# Patient Record
Sex: MALE | Race: WHITE | NOT HISPANIC OR LATINO | Employment: FULL TIME | URBAN - METROPOLITAN AREA
[De-identification: names, ages, dates, MRNs, and addresses within clinical notes are randomized per-mention and may not be internally consistent; named-entity substitution may affect disease eponyms.]

---

## 2018-03-19 ENCOUNTER — OFFICE VISIT (OUTPATIENT)
Dept: URGENT CARE | Facility: CLINIC | Age: 38
End: 2018-03-19
Payer: COMMERCIAL

## 2018-03-19 VITALS
RESPIRATION RATE: 17 BRPM | HEIGHT: 76 IN | WEIGHT: 199 LBS | TEMPERATURE: 98 F | DIASTOLIC BLOOD PRESSURE: 87 MMHG | SYSTOLIC BLOOD PRESSURE: 117 MMHG | OXYGEN SATURATION: 96 % | BODY MASS INDEX: 24.23 KG/M2 | HEART RATE: 82 BPM

## 2018-03-19 DIAGNOSIS — J06.9 VIRAL URI WITH COUGH: Primary | ICD-10-CM

## 2018-03-19 DIAGNOSIS — J02.9 SORE THROAT: ICD-10-CM

## 2018-03-19 DIAGNOSIS — R68.89 FLU-LIKE SYMPTOMS: ICD-10-CM

## 2018-03-19 LAB
CTP QC/QA: YES
CTP QC/QA: YES
FLUAV AG NPH QL: NEGATIVE
FLUBV AG NPH QL: NEGATIVE
S PYO RRNA THROAT QL PROBE: NEGATIVE

## 2018-03-19 PROCEDURE — 87804 INFLUENZA ASSAY W/OPTIC: CPT | Mod: 59,QW,S$GLB, | Performed by: NURSE PRACTITIONER

## 2018-03-19 PROCEDURE — 87880 STREP A ASSAY W/OPTIC: CPT | Mod: QW,S$GLB,, | Performed by: NURSE PRACTITIONER

## 2018-03-19 PROCEDURE — 99203 OFFICE O/P NEW LOW 30 MIN: CPT | Mod: S$GLB,,, | Performed by: NURSE PRACTITIONER

## 2018-03-19 RX ORDER — BENZONATATE 100 MG/1
100 CAPSULE ORAL EVERY 6 HOURS PRN
Qty: 30 CAPSULE | Refills: 0 | Status: SHIPPED | OUTPATIENT
Start: 2018-03-19 | End: 2019-03-19

## 2018-03-19 RX ORDER — FLUTICASONE PROPIONATE 50 MCG
1 SPRAY, SUSPENSION (ML) NASAL 2 TIMES DAILY
Qty: 1 BOTTLE | Refills: 0 | Status: SHIPPED | OUTPATIENT
Start: 2018-03-19

## 2018-03-19 NOTE — PATIENT INSTRUCTIONS
Please drink plenty of fluids.  Please get plenty of rest.  Please return here or go to the Emergency Department for any concerns or worsening of condition.  If you do not have Hypertension or any history of palpitations, it is ok to take over the counter Sudafed or Mucinex D or Allegra-D or Claritin-D or Zyrtec-D.  If you do take one of the above, it is ok to combine that with plain over the counter Mucinex or Allegra or Claritin or Zyrtec.  If for example you are taking Zyrtec -D, you can combine that with Mucinex, but not Mucinex-D.  If you are taking Mucinex-D, you can combine that with plain Allegra or Claritin or Zyrtec.   If you do have Hypertension or palpitations, it is safe to take Coricidin HBP for relief of sinus symptoms.  If not allergic, please take over the counter Tylenol (Acetaminophen) and/or Motrin (Ibuprofen) as directed for control of pain and/or fever.  Please follow up with your primary care doctor or specialist as needed.    If you  smoke, please stop smoking.    Viral Upper Respiratory Illness (Adult)   You have a viral upper respiratory illness (URI), which is another term for the common cold. This illness is contagious during the first few days. It is spread through the air by coughing and sneezing. It may also be spread by direct contact (touching the sick person and then touching your own eyes, nose, or mouth). Frequent handwashing will decrease risk of spread. Most viral illnesses go away within 7 to 10 days with rest and simple home remedies. Sometimes the illness may last for several weeks. Antibiotics will not kill a virus, and they are generally not prescribed for this condition.    Home care  · If symptoms are severe, rest at home for the first 2 to 3 days. When you resume activity, don't let yourself get too tired.  · Avoid being exposed to cigarette smoke (yours or others).  · You may use acetaminophen or ibuprofen to control pain and fever, unless another medicine was  prescribed. (Note: If you have chronic liver or kidney disease, have ever had a stomach ulcer or gastrointestinal bleeding, or are taking blood-thinning medicines, talk with your healthcare provider before using these medicines.) Aspirin should never be given to anyone under 18 years of age who is ill with a viral infection or fever. It may cause severe liver or brain damage.  · Your appetite may be poor, so a light diet is fine. Avoid dehydration by drinking 6 to 8 glasses of fluids per day (water, soft drinks, juices, tea, or soup). Extra fluids will help loosen secretions in the nose and lungs.  · Over-the-counter cold medicines will not shorten the length of time youre sick, but they may be helpful for the following symptoms: cough, sore throat, and nasal and sinus congestion. (Note: Do not use decongestants if you have high blood pressure.)  Follow-up care  Follow up with your healthcare provider, or as advised.  When to seek medical advice  Call your healthcare provider right away if any of these occur:  · Cough with lots of colored sputum (mucus)  · Severe headache; face, neck, or ear pain  · Difficulty swallowing due to throat pain  · Fever of 100.4°F (38°C)  Call 911, or get immediate medical care  Call emergency services right away if any of these occur:  · Chest pain, shortness of breath, wheezing, or difficulty breathing  · Coughing up blood  · Inability to swallow due to throat pain  Date Last Reviewed: 9/13/2015  © 9040-6612 [employer]. [address] [place] [address], [place] [address]. All rights reserved. This information is not intended as a substitute for professional medical care. Always follow your healthcare professional's instructions.

## 2018-03-19 NOTE — PROGRESS NOTES
"Subjective:       Patient ID: Ramakrishna Vargas is a 37 y.o. male.    Vitals:  height is 6' 3.98" (1.93 m) and weight is 90.3 kg (199 lb). His oral temperature is 98.1 °F (36.7 °C). His blood pressure is 117/87 and his pulse is 82. His respiration is 17 and oxygen saturation is 96%.     Chief Complaint: Nasal Congestion (started 3/15) and Cough (started 3/15)    Patient has cough, minor pain in throat, minor weakness overall and onset since 3/15. Cough worse at night. Tried OTC cough and cold with min relief.  No fever, N/V/D or sick contacts. Denies previous history of allergy.      Review of Systems   Constitution: Positive for malaise/fatigue. Negative for chills and fever.   HENT: Negative for congestion, ear pain, hoarse voice and sore throat.    Eyes: Negative for discharge and redness.   Cardiovascular: Negative for chest pain, dyspnea on exertion and leg swelling.   Respiratory: Positive for cough, shortness of breath and sputum production. Negative for wheezing.         Throat pain   Musculoskeletal: Positive for myalgias.   Gastrointestinal: Negative for abdominal pain and nausea.   Neurological: Positive for headaches.       Objective:      Physical Exam   Constitutional: He is oriented to person, place, and time. He appears well-developed and well-nourished. He is cooperative.  Non-toxic appearance. He does not appear ill. No distress.   HENT:   Head: Normocephalic and atraumatic.   Right Ear: Hearing, tympanic membrane, external ear and ear canal normal.   Left Ear: Hearing, tympanic membrane, external ear and ear canal normal.   Nose: Rhinorrhea present. No mucosal edema or nasal deformity. No epistaxis. Right sinus exhibits no maxillary sinus tenderness and no frontal sinus tenderness. Left sinus exhibits no maxillary sinus tenderness and no frontal sinus tenderness.   Mouth/Throat: Uvula is midline and mucous membranes are normal. No trismus in the jaw. Normal dentition. No uvula swelling. Posterior " oropharyngeal erythema present.       Eyes: Conjunctivae and lids are normal. No scleral icterus.   Sclera clear bilat   Neck: Trachea normal, full passive range of motion without pain and phonation normal. Neck supple.   Cardiovascular: Normal rate, regular rhythm, normal heart sounds, intact distal pulses and normal pulses.    Pulmonary/Chest: Effort normal and breath sounds normal. No respiratory distress. He has no wheezes. He has no rhonchi. He has no rales.   Abdominal: Soft. Normal appearance and bowel sounds are normal. He exhibits no distension. There is no tenderness.   Musculoskeletal: Normal range of motion. He exhibits no edema or deformity.   Neurological: He is alert and oriented to person, place, and time. He exhibits normal muscle tone. Coordination normal.   Skin: Skin is warm, dry and intact. He is not diaphoretic. No pallor.   Psychiatric: He has a normal mood and affect. His speech is normal and behavior is normal. Judgment and thought content normal. Cognition and memory are normal.   Nursing note and vitals reviewed.      POCT Influenza A/B   Order: [id number]   Status:  Final result   Visible to patient:  No (Not Released)   Next appt:  None   Dx:  Flu-like symptoms    Ref Range & Units 09:15   Rapid Influenza A Ag Negative Negative    Rapid Influenza B Ag Negative Negative     Acceptable  Yes    Resulting Agency  [facility]           POCT rapid strep A   Order: [id number]   Status:  Final result   Visible to patient:  No (Not Released)   Next appt:  None   Dx:  Sore throat   Newer results are available. Click to view them now.    Ref Range & Units 09:08   Rapid Strep A Screen Negative Negative     Acceptable  Yes    Resulting Agency  [facility]             Assessment:       1. Viral URI with cough    2. Sore throat    3. Flu-like symptoms        Plan:         Viral URI with cough  -     fluticasone (FLONASE) 50 mcg/actuation nasal spray; 1 spray (50 mcg total) by Each Nare  route 2 (two) times daily.  Dispense: 1 Bottle; Refill: 0  -     benzonatate (TESSALON PERLES) 100 MG capsule; Take 1 capsule (100 mg total) by mouth every 6 (six) hours as needed.  Dispense: 30 capsule; Refill: 0    Sore throat  -     POCT rapid strep A    Flu-like symptoms  -     POCT Influenza A/B      Patient Instructions     Please drink plenty of fluids.  Please get plenty of rest.  Please return here or go to the Emergency Department for any concerns or worsening of condition.  If you do not have Hypertension or any history of palpitations, it is ok to take over the counter Sudafed or Mucinex D or Allegra-D or Claritin-D or Zyrtec-D.  If you do take one of the above, it is ok to combine that with plain over the counter Mucinex or Allegra or Claritin or Zyrtec.  If for example you are taking Zyrtec -D, you can combine that with Mucinex, but not Mucinex-D.  If you are taking Mucinex-D, you can combine that with plain Allegra or Claritin or Zyrtec.   If you do have Hypertension or palpitations, it is safe to take Coricidin HBP for relief of sinus symptoms.  If not allergic, please take over the counter Tylenol (Acetaminophen) and/or Motrin (Ibuprofen) as directed for control of pain and/or fever.  Please follow up with your primary care doctor or specialist as needed.    If you  smoke, please stop smoking.    Viral Upper Respiratory Illness (Adult)   You have a viral upper respiratory illness (URI), which is another term for the common cold. This illness is contagious during the first few days. It is spread through the air by coughing and sneezing. It may also be spread by direct contact (touching the sick person and then touching your own eyes, nose, or mouth). Frequent handwashing will decrease risk of spread. Most viral illnesses go away within 7 to 10 days with rest and simple home remedies. Sometimes the illness may last for several weeks. Antibiotics will not kill a virus, and they are generally not  prescribed for this condition.    Home care  · If symptoms are severe, rest at home for the first 2 to 3 days. When you resume activity, don't let yourself get too tired.  · Avoid being exposed to cigarette smoke (yours or others).  · You may use acetaminophen or ibuprofen to control pain and fever, unless another medicine was prescribed. (Note: If you have chronic liver or kidney disease, have ever had a stomach ulcer or gastrointestinal bleeding, or are taking blood-thinning medicines, talk with your healthcare provider before using these medicines.) Aspirin should never be given to anyone under 18 years of age who is ill with a viral infection or fever. It may cause severe liver or brain damage.  · Your appetite may be poor, so a light diet is fine. Avoid dehydration by drinking 6 to 8 glasses of fluids per day (water, soft drinks, juices, tea, or soup). Extra fluids will help loosen secretions in the nose and lungs.  · Over-the-counter cold medicines will not shorten the length of time youre sick, but they may be helpful for the following symptoms: cough, sore throat, and nasal and sinus congestion. (Note: Do not use decongestants if you have high blood pressure.)  Follow-up care  Follow up with your healthcare provider, or as advised.  When to seek medical advice  Call your healthcare provider right away if any of these occur:  · Cough with lots of colored sputum (mucus)  · Severe headache; face, neck, or ear pain  · Difficulty swallowing due to throat pain  · Fever of 100.4°F (38°C)  Call 911, or get immediate medical care  Call emergency services right away if any of these occur:  · Chest pain, shortness of breath, wheezing, or difficulty breathing  · Coughing up blood  · Inability to swallow due to throat pain  Date Last Reviewed: 9/13/2015  © 4994-5540 [employer]. [address] [place] [address], [place] [address]. All rights reserved. This information is not intended as a substitute for  professional medical care. Always follow your healthcare professional's instructions.

## 2019-04-02 ENCOUNTER — HOSPITAL ENCOUNTER (OUTPATIENT)
Dept: RADIOLOGY | Facility: HOSPITAL | Age: 39
Discharge: HOME | End: 2019-04-02
Attending: ORTHOPAEDIC SURGERY
Payer: COMMERCIAL

## 2019-04-02 ENCOUNTER — OFFICE VISIT (OUTPATIENT)
Dept: SPORTS MEDICINE | Facility: CLINIC | Age: 39
End: 2019-04-02
Payer: COMMERCIAL

## 2019-04-02 VITALS
DIASTOLIC BLOOD PRESSURE: 87 MMHG | HEIGHT: 75 IN | BODY MASS INDEX: 24.74 KG/M2 | SYSTOLIC BLOOD PRESSURE: 151 MMHG | WEIGHT: 199 LBS | HEART RATE: 62 BPM

## 2019-04-02 DIAGNOSIS — M25.561 RIGHT KNEE PAIN, UNSPECIFIED CHRONICITY: Primary | ICD-10-CM

## 2019-04-02 DIAGNOSIS — M25.561 RIGHT KNEE PAIN, UNSPECIFIED CHRONICITY: ICD-10-CM

## 2019-04-02 PROCEDURE — 73564 X-RAY EXAM KNEE 4 OR MORE: CPT | Mod: TC,50,FY,PO

## 2019-04-02 PROCEDURE — 73564 X-RAY EXAM KNEE 4 OR MORE: CPT | Mod: 26,50,, | Performed by: RADIOLOGY

## 2019-04-02 PROCEDURE — 99203 OFFICE O/P NEW LOW 30 MIN: CPT | Mod: S$GLB,,, | Performed by: ORTHOPAEDIC SURGERY

## 2019-04-02 PROCEDURE — 99999 PR PBB SHADOW E&M-EST. PATIENT-LVL III: CPT | Mod: PBBFAC,,, | Performed by: ORTHOPAEDIC SURGERY

## 2019-04-02 PROCEDURE — 99999 PR PBB SHADOW E&M-EST. PATIENT-LVL III: ICD-10-PCS | Mod: PBBFAC,,, | Performed by: ORTHOPAEDIC SURGERY

## 2019-04-02 PROCEDURE — 73564 XR KNEE ORTHO BILAT WITH FLEXION: ICD-10-PCS | Mod: 26,50,, | Performed by: RADIOLOGY

## 2019-04-02 PROCEDURE — 99203 PR OFFICE/OUTPT VISIT, NEW, LEVL III, 30-44 MIN: ICD-10-PCS | Mod: S$GLB,,, | Performed by: ORTHOPAEDIC SURGERY

## 2019-04-02 NOTE — PROGRESS NOTES
CC: Right knee pain    39 y.o. Male with a history of right knee pain for a couple months.  He was working on his fence at home; squatting and turning. Feels like he is changing his gait due to pain knee pain and its causing him to have low back pain.  Previously tried Naproxen.    He reports that the pain and weakness. It also bothers him at night.    + mechanical symptoms, - instability    Is affecting ADLs.  Pain is 0/10 at it's worst.    REVIEW OF SYSTEMS:   Constitution: Negative. Negative for chills, fever and night sweats.   HENT: Negative for congestion and headaches.    Eyes: Negative for blurred vision, left vision loss and right vision loss.   Cardiovascular: Negative for chest pain and syncope.   Respiratory: Negative for cough and shortness of breath.    Endocrine: Negative for polydipsia, polyphagia and polyuria.   Hematologic/Lymphatic: Negative for bleeding problem. Does not bruise/bleed easily.   Skin: Negative for dry skin, itching and rash.   Musculoskeletal: Negative for falls. Positive for right knee pain and  muscle weakness.   Gastrointestinal: Negative for abdominal pain and bowel incontinence.   Genitourinary: Negative for bladder incontinence and nocturia.   Neurological: Negative for disturbances in coordination, loss of balance and seizures.   Psychiatric/Behavioral: Negative for depression. The patient does not have insomnia.    Allergic/Immunologic: Negative for hives and persistent infections.     PAST MEDICAL HISTORY:   History reviewed. No pertinent past medical history.    PAST SURGICAL HISTORY:   Past Surgical History:   Procedure Laterality Date    REMOVAL, FOREIGN BODY, FOOT Left 12/29/2012    Performed by Hillary Aguila MD at [facility] OR [address] [place] [address]       FAMILY HISTORY:   No family history on file.    SOCIAL HISTORY:   Social History     Socioeconomic History    Marital status: Single     Spouse name: Not on file    Number of children: Not on file    Years of education: Not  "on file    Highest education level: Not on file   Occupational History    Not on file   Social Needs    Financial resource strain: Not on file    Food insecurity:     Worry: Not on file     Inability: Not on file    Transportation needs:     Medical: Not on file     Non-medical: Not on file   Tobacco Use    Smoking status: Never Smoker   Substance and Sexual Activity    Alcohol use: Yes     Comment: + moderate use of alcohol.  Lives at home    Drug use: Not on file    Sexual activity: Not on file   Lifestyle    Physical activity:     Days per week: Not on file     Minutes per session: Not on file    Stress: Not on file   Relationships    Social connections:     Talks on phone: Not on file     Gets together: Not on file     Attends [employer] service: Not on file     Active member of club or organization: Not on file     Attends meetings of clubs or organizations: Not on file     Relationship status: Not on file    Intimate partner violence:     Fear of current or ex partner: Not on file     Emotionally abused: Not on file     Physically abused: Not on file     Forced sexual activity: Not on file   Other Topics Concern    Not on file   Social History Narrative    Not on file       MEDICATIONS:     Current Outpatient Medications:     fluticasone (FLONASE) 50 mcg/actuation nasal spray, 1 spray (50 mcg total) by Each Nare route 2 (two) times daily., Disp: 1 Bottle, Rfl: 0    ALLERGIES:   Review of patient's allergies indicates:   Allergen Reactions    Acetaminophen      Patient states he thought he would be nauseous when he took.        VITAL SIGNS:   BP (!) 151/87   Pulse 62   Ht 6' 3" (1.905 m)   Wt 90.3 kg (199 lb)   BMI 24.87 kg/m²      PHYSICAL EXAMINATION:  General:  The patient is alert and oriented x 3.  Mood is pleasant.  Observation of ears, eyes and nose reveal no gross abnormalities.  No labored breathing observed.    RIGHT KNEE EXAMINATION     OBSERVATION / INSPECTION   Gait:   Antalgic "   Alignment:  Neutral   Scars:   None   Muscle atrophy: Mild  Effusion:  None   Warmth:  None   Discoloration:   none     TENDERNESS / CREPITUS (T / C):          T / C      T / C   Patella   - / -   Lateral joint line   - / -   Peripatellar medial  -  Medial joint line    + / -   Peripatellar lateral -  Medial plica   - / -   Patellar tendon -   Popliteal fossa   - / -   Quad tendon   -   Gastrocnemius   -   Prepatellar Bursa - / -   Quadricep   -   Tibial tubercle  -  Thigh/hamstring  -   Pes anserine/HS -  Fibula    -   ITB   - / -  Tibia     -   Tib/fib joint  - / -  LCL    -     MFC   - / -   MCL: Proximal  -    LFC   - / -    Distal   -          ROM: (* = pain)  PASSIVE   ACTIVE    Left :   5 / 0 / 145   5 / 0 / 145     Right :    5 / 0 / 145   5 / 0 / 145    Patellofemoral examination:  See above noted areas of tenderness.   Patella position    Subluxation / dislocation: Centered           Sup. / Inf;   Normal   Crepitus (PF):    Absent   Patellar Mobility:       Medial-lateral:   Normal    Superior-inferior:  Normal    Inferior tilt   Normal    Patellar tendon:  Normal   Lateral tilt:    Normal   J-sign:     None   Patellofemoral grind:   No pain       MENISCAL SIGNS:     Pain on terminal extension:  -  Pain on terminal flexion:  +  Rebekahs maneuver:  + (for pain)  Squat     + (for pain)    LIGAMENT EXAMINATION:  ACL / Lachman:  normal (-1 to 2mm)    PCL-Post.  drawer: normal 0 to 2mm  MCL- Valgus:  normal 0 to 2mm  LCL- Varus:  normal 0 to 2mm  Pivot shift: normal (Equal)   Dial Test: difference c/w other side   At 30° flexion: normal (< 5°)    At 90° flexion: normal (< 5°)   Reverse Pivot Shift:   normal (Equal)     STRENGTH: (* = with pain) PAINFUL SIDE   Quadricep   5/5   Hamstrin/5    EXTREMITY NEURO-VASCULAR EXAMINATION:   Sensation:  Grossly intact to light touch all dermatomal regions.   Motor Function:  Fully intact motor function at hip, knee, foot and ankle    DTRs;  quadriceps and   achilles 2+.  No clonus and downgoing Babinski.    Vascular status:  DP and PT pulses 2+, brisk capillary refill, symmetric.     XRAY (4/2/19): There is mild medial tibiofemoral joint space narrowing of the bilateral knees.  No significant osteophyte formation.  No evidence for radiopaque foreign body or chondrocalcinosis.  No fracture or dislocation.  Bony mineralization appears within normal limits.  No large joint effusion appreciated.  Surrounding soft tissues are unremarkable.      ASSESSMENT:    Right knee pain, medial meniscus tear    PLAN:   1. MRI Right knee  2. Follow up in clinic to discuss results      All questions were answered, pt will contact us for questions or concerns in the interim.

## 2019-04-09 ENCOUNTER — HOSPITAL ENCOUNTER (OUTPATIENT)
Dept: RADIOLOGY | Facility: HOSPITAL | Age: 39
Discharge: HOME | End: 2019-04-09
Attending: STUDENT IN AN ORGANIZED HEALTH CARE EDUCATION/TRAINING PROGRAM
Payer: COMMERCIAL

## 2019-04-09 DIAGNOSIS — M25.561 RIGHT KNEE PAIN, UNSPECIFIED CHRONICITY: ICD-10-CM

## 2019-04-09 PROCEDURE — 73721 MRI JNT OF LWR EXTRE W/O DYE: CPT | Mod: 26,RT,, | Performed by: RADIOLOGY

## 2019-04-09 PROCEDURE — 73721 MRI JNT OF LWR EXTRE W/O DYE: CPT | Mod: TC,RT

## 2019-04-09 PROCEDURE — 73721 MRI KNEE WITHOUT CONTRAST RIGHT: ICD-10-PCS | Mod: 26,RT,, | Performed by: RADIOLOGY

## 2019-04-12 ENCOUNTER — OFFICE VISIT (OUTPATIENT)
Dept: SPORTS MEDICINE | Facility: CLINIC | Age: 39
End: 2019-04-12
Payer: COMMERCIAL

## 2019-04-12 VITALS
HEIGHT: 75 IN | SYSTOLIC BLOOD PRESSURE: 138 MMHG | WEIGHT: 199 LBS | BODY MASS INDEX: 24.74 KG/M2 | DIASTOLIC BLOOD PRESSURE: 86 MMHG | HEART RATE: 68 BPM

## 2019-04-12 DIAGNOSIS — S83.241D TEAR OF MEDIAL MENISCUS OF RIGHT KNEE, CURRENT, UNSPECIFIED TEAR TYPE, SUBSEQUENT ENCOUNTER: Primary | ICD-10-CM

## 2019-04-12 PROCEDURE — 99999 PR PBB SHADOW E&M-EST. PATIENT-LVL III: CPT | Mod: PBBFAC,,, | Performed by: PHYSICIAN ASSISTANT

## 2019-04-12 PROCEDURE — 99214 PR OFFICE/OUTPT VISIT, EST, LEVL IV, 30-39 MIN: ICD-10-PCS | Mod: S$GLB,,, | Performed by: PHYSICIAN ASSISTANT

## 2019-04-12 PROCEDURE — 99999 PR PBB SHADOW E&M-EST. PATIENT-LVL III: ICD-10-PCS | Mod: PBBFAC,,, | Performed by: PHYSICIAN ASSISTANT

## 2019-04-12 PROCEDURE — 99214 OFFICE O/P EST MOD 30 MIN: CPT | Mod: S$GLB,,, | Performed by: PHYSICIAN ASSISTANT

## 2019-04-12 RX ORDER — MELOXICAM 15 MG/1
15 TABLET ORAL DAILY
Qty: 30 TABLET | Refills: 0 | Status: SHIPPED | OUTPATIENT
Start: 2019-04-12 | End: 2019-05-12

## 2019-04-12 NOTE — PROGRESS NOTES
CC: Right knee pain    39 y.o. Male with a history of right knee pain for a couple months. He was working on his fence at home; squatting and turning and pain developed afterwards. No acute injury or trauma. He previously was limping due to knee pain but this has much improved since last visit. He has been taking Naproxen with good relief. No prior knee surgery.    He reports that the pain and weakness. It also bothers him at night.    + mechanical symptoms, - instability    Is affecting ADLs.  Pain is 0/10  Today.      REVIEW OF SYSTEMS:   Constitution: Negative. Negative for chills, fever and night sweats.   HENT: Negative for congestion and headaches.    Eyes: Negative for blurred vision, left vision loss and right vision loss.   Cardiovascular: Negative for chest pain and syncope.   Respiratory: Negative for cough and shortness of breath.    Endocrine: Negative for polydipsia, polyphagia and polyuria.   Hematologic/Lymphatic: Negative for bleeding problem. Does not bruise/bleed easily.   Skin: Negative for dry skin, itching and rash.   Musculoskeletal: Negative for falls. Positive for right knee pain and  muscle weakness.   Gastrointestinal: Negative for abdominal pain and bowel incontinence.   Genitourinary: Negative for bladder incontinence and nocturia.   Neurological: Negative for disturbances in coordination, loss of balance and seizures.   Psychiatric/Behavioral: Negative for depression. The patient does not have insomnia.    Allergic/Immunologic: Negative for hives and persistent infections.     PAST MEDICAL HISTORY:   History reviewed. No pertinent past medical history.    PAST SURGICAL HISTORY:   Past Surgical History:   Procedure Laterality Date    REMOVAL, FOREIGN BODY, FOOT Left 12/29/2012    Performed by Hillary Aguila MD at [facility] OR [address] [place] [address]       FAMILY HISTORY:   History reviewed. No pertinent family history.    SOCIAL HISTORY:   Social History     Socioeconomic History    Marital status:  "Single     Spouse name: Not on file    Number of children: Not on file    Years of education: Not on file    Highest education level: Not on file   Occupational History    Not on file   Social Needs    Financial resource strain: Not on file    Food insecurity:     Worry: Not on file     Inability: Not on file    Transportation needs:     Medical: Not on file     Non-medical: Not on file   Tobacco Use    Smoking status: Never Smoker   Substance and Sexual Activity    Alcohol use: Yes     Comment: + moderate use of alcohol.  Lives at home    Drug use: Not on file    Sexual activity: Not on file   Lifestyle    Physical activity:     Days per week: Not on file     Minutes per session: Not on file    Stress: Not on file   Relationships    Social connections:     Talks on phone: Not on file     Gets together: Not on file     Attends [employer] service: Not on file     Active member of club or organization: Not on file     Attends meetings of clubs or organizations: Not on file     Relationship status: Not on file   Other Topics Concern    Not on file   Social History Narrative    Not on file       MEDICATIONS:     Current Outpatient Medications:     fluticasone (FLONASE) 50 mcg/actuation nasal spray, 1 spray (50 mcg total) by Each Nare route 2 (two) times daily., Disp: 1 Bottle, Rfl: 0    ALLERGIES:   Review of patient's allergies indicates:   Allergen Reactions    Acetaminophen      Patient states he thought he would be nauseous when he took.        VITAL SIGNS:   /86   Pulse 68   Ht 6' 3" (1.905 m)   Wt 90.3 kg (199 lb)   BMI 24.87 kg/m²      PHYSICAL EXAMINATION:  General:  The patient is alert and oriented x 3.  Mood is pleasant.  Observation of ears, eyes and nose reveal no gross abnormalities.  No labored breathing observed.    RIGHT KNEE EXAMINATION     OBSERVATION / INSPECTION   Gait:   Antalgic   Alignment:  Neutral   Scars:   None   Muscle atrophy: Mild  Effusion:  None   Warmth:  None "   Discoloration:   none     TENDERNESS / CREPITUS (T / C):          T / C      T / C   Patella   - / -   Lateral joint line   - / -   Peripatellar medial  -  Medial joint line    + / -   Peripatellar lateral -  Medial plica   - / -   Patellar tendon -   Popliteal fossa   - / -   Quad tendon   -   Gastrocnemius   -   Prepatellar Bursa - / -   Quadricep   -   Tibial tubercle  -  Thigh/hamstring  -   Pes anserine/HS -  Fibula    -   ITB   - / -  Tibia     -   Tib/fib joint  - / -  LCL    -     MFC   - / -   MCL: Proximal  -    LFC   - / -    Distal   -          ROM: (* = pain)  PASSIVE   ACTIVE    Left :   5 / 0 / 145   5 / 0 / 145     Right :    5 / 0 / 145   5 / 0 / 145    Patellofemoral examination:  See above noted areas of tenderness.   Patella position    Subluxation / dislocation: Centered           Sup. / Inf;   Normal   Crepitus (PF):    Absent   Patellar Mobility:       Medial-lateral:   Normal    Superior-inferior:  Normal    Inferior tilt   Normal    Patellar tendon:  Normal   Lateral tilt:    Normal   J-sign:     None   Patellofemoral grind:   No pain       MENISCAL SIGNS:     Pain on terminal extension:  -  Pain on terminal flexion:  +  Rebekahs maneuver:  + (for pain)  Squat     + (for pain)    LIGAMENT EXAMINATION:  ACL / Lachman:  normal (-1 to 2mm)    PCL-Post.  drawer: normal 0 to 2mm  MCL- Valgus:  normal 0 to 2mm  LCL- Varus:  normal 0 to 2mm  Pivot shift: normal (Equal)   Dial Test: difference c/w other side   At 30° flexion: normal (< 5°)    At 90° flexion: normal (< 5°)   Reverse Pivot Shift:   normal (Equal)     STRENGTH: (* = with pain) PAINFUL SIDE   Quadricep   5/5   Hamstrin/5    EXTREMITY NEURO-VASCULAR EXAMINATION:   Sensation:  Grossly intact to light touch all dermatomal regions.   Motor Function:  Fully intact motor function at hip, knee, foot and ankle    DTRs;  quadriceps and  achilles 2+.  No clonus and downgoing Babinski.    Vascular status:  DP and PT pulses 2+, brisk  capillary refill, symmetric.     XRAY (4/2/19): There is mild medial tibiofemoral joint space narrowing of the bilateral knees.  No significant osteophyte formation.  No evidence for radiopaque foreign body or chondrocalcinosis.  No fracture or dislocation.  Bony mineralization appears within normal limits.  No large joint effusion appreciated.  Surrounding soft tissues are unremarkable.     RIGHT KNEE MRI 4/9/19    FINDINGS:  Menisci:  There is a horizontal tear of the posterior horn and body segment medial meniscus.  Lateral meniscus is intact.    Ligaments:  ACL, PCL, MCL, and LCL complex are intact.    Tendons:  Extensor mechanism is maintained.    Cartilage:    Patellofemoral: Partial-thickness fissuring noted over the patellar median ridge.    Medial tibiofemoral: Articular cartilage is maintained.    Lateral tibiofemoral: Articular cartilage is maintained.    Bone: There is a mild subcortical fracture with bone marrow edema at the medial aspect of the medial femoral condyle.    Miscellaneous: There is no joint effusion.    Impression:  1. Horizontal tear of posterior horn and body segment medial meniscus.  2. Mild subcortical fracture of medial femoral condyle.  3. Partial-thickness chondral fissuring over the patellar median ridge.    ASSESSMENT:    Right knee pain, medial meniscus tear    PLAN:   1. Start mobic  2. Knee arthroscopy, PT and CSI discussed. Patient is feeling pretty good today but will let us know if pain returns and he would like to schedule a knee arthroscopy.  3. RTC as needed for follow up    All questions were answered, pt will contact us for questions or concerns in the interim.

## 2020-09-08 ENCOUNTER — OFFICE VISIT (OUTPATIENT)
Dept: DERMATOLOGY | Facility: CLINIC | Age: 40
End: 2020-09-08
Payer: COMMERCIAL

## 2020-09-08 DIAGNOSIS — B35.1 ONYCHOMYCOSIS DUE TO DERMATOPHYTE: ICD-10-CM

## 2020-09-08 DIAGNOSIS — Z80.8 FAMILY HISTORY OF MELANOMA: ICD-10-CM

## 2020-09-08 DIAGNOSIS — D48.5 NEOPLASM OF UNCERTAIN BEHAVIOR OF SKIN: Primary | ICD-10-CM

## 2020-09-08 DIAGNOSIS — L82.1 SK (SEBORRHEIC KERATOSIS): ICD-10-CM

## 2020-09-08 DIAGNOSIS — D22.9 MULTIPLE BENIGN NEVI: ICD-10-CM

## 2020-09-08 DIAGNOSIS — L81.4 SOLAR LENTIGO: ICD-10-CM

## 2020-09-08 PROCEDURE — 99203 PR OFFICE/OUTPT VISIT, NEW, LEVL III, 30-44 MIN: ICD-10-PCS | Mod: 25,S$GLB,, | Performed by: PATHOLOGY

## 2020-09-08 PROCEDURE — 11103 TANGNTL BX SKIN EA SEP/ADDL: CPT | Mod: S$GLB,,, | Performed by: PATHOLOGY

## 2020-09-08 PROCEDURE — 11103 PR TANGENTIAL BIOPSY, SKIN, EA ADDTL LESION: ICD-10-PCS | Mod: S$GLB,,, | Performed by: PATHOLOGY

## 2020-09-08 PROCEDURE — 88305 TISSUE EXAM BY PATHOLOGIST: CPT | Mod: 26,,, | Performed by: DERMATOLOGY

## 2020-09-08 PROCEDURE — 11102 TANGNTL BX SKIN SINGLE LES: CPT | Mod: S$GLB,,, | Performed by: PATHOLOGY

## 2020-09-08 PROCEDURE — 99203 OFFICE O/P NEW LOW 30 MIN: CPT | Mod: 25,S$GLB,, | Performed by: PATHOLOGY

## 2020-09-08 PROCEDURE — 88305 TISSUE EXAM BY PATHOLOGIST: CPT | Performed by: DERMATOLOGY

## 2020-09-08 PROCEDURE — 88342 IMHCHEM/IMCYTCHM 1ST ANTB: CPT | Mod: 26,,, | Performed by: DERMATOLOGY

## 2020-09-08 PROCEDURE — 99999 PR PBB SHADOW E&M-EST. PATIENT-LVL III: ICD-10-PCS | Mod: PBBFAC,,, | Performed by: PATHOLOGY

## 2020-09-08 PROCEDURE — 88305 TISSUE EXAM BY PATHOLOGIST: ICD-10-PCS | Mod: 26,,, | Performed by: DERMATOLOGY

## 2020-09-08 PROCEDURE — 11102 PR TANGENTIAL BIOPSY, SKIN, SINGLE LESION: ICD-10-PCS | Mod: S$GLB,,, | Performed by: PATHOLOGY

## 2020-09-08 PROCEDURE — 88342 IMHCHEM/IMCYTCHM 1ST ANTB: CPT | Performed by: DERMATOLOGY

## 2020-09-08 PROCEDURE — 88342 CHG IMMUNOCYTOCHEMISTRY: ICD-10-PCS | Mod: 26,,, | Performed by: DERMATOLOGY

## 2020-09-08 PROCEDURE — 99999 PR PBB SHADOW E&M-EST. PATIENT-LVL III: CPT | Mod: PBBFAC,,, | Performed by: PATHOLOGY

## 2020-09-08 RX ORDER — TAVABOROLE 43.5 MG/ML
1 SOLUTION TOPICAL DAILY
Qty: 4 ML | Refills: 3 | Status: SHIPPED | OUTPATIENT
Start: 2020-09-08 | End: 2022-08-05

## 2020-09-08 NOTE — PATIENT INSTRUCTIONS

## 2020-09-08 NOTE — PROGRESS NOTES
"  Subjective:       Patient ID:  Ramakrishna Vargas is a 40 y.o. male who presents for   Chief Complaint   Patient presents with    Skin Check     UBSE     HPI     Patient is here today for a "mole" check.   Pt has a history of  moderate sun exposure in the past.   Pt recalls several blistering sunburns in the past- yes  Pt has history of tanning bed use- no  Pt has  had moles removed in the past- yes all benign   Pt has history of melanoma in first degree relatives-  Father had some type not sure    Pt presents today for UBSE    Review of Systems   Constitutional: Negative for fever, chills, weight loss, weight gain, fatigue, night sweats and malaise.   Skin: Positive for activity-related sunscreen use and wears hat. Negative for itching, rash and daily sunscreen use.   Hematologic/Lymphatic: Does not bruise/bleed easily.        Objective:    Physical Exam   Constitutional: He appears well-developed and well-nourished. No distress.   Neurological: He is alert and oriented to person, place, and time. He is not disoriented.   Psychiatric: He has a normal mood and affect.   Skin:   Areas Examined (abnormalities noted in diagram):   Scalp / Hair Palpated and Inspected  Head / Face Inspection Performed  Neck Inspection Performed  Chest / Axilla Inspection Performed  Abdomen Inspection Performed  Genitals / Buttocks / Groin Inspection Performed  Back Inspection Performed  RUE Inspected  LUE Inspection Performed  RLE Inspected  LLE Inspection Performed  Nails and Digits Inspection Performed                       Diagram Legend     Erythematous scaling macule/papule c/w actinic keratosis       Vascular papule c/w angioma      Pigmented verrucoid papule/plaque c/w seborrheic keratosis      Yellow umbilicated papule c/w sebaceous hyperplasia      Irregularly shaped tan macule c/w lentigo     1-2 mm smooth white papules consistent with Milia      Movable subcutaneous cyst with punctum c/w epidermal inclusion cyst      Subcutaneous " movable cyst c/w pilar cyst      Firm pink to brown papule c/w dermatofibroma      Pedunculated fleshy papule(s) c/w skin tag(s)      Evenly pigmented macule c/w junctional nevus     Mildly variegated pigmented, slightly irregular-bordered macule c/w mildly atypical nevus      Flesh colored to evenly pigmented papule c/w intradermal nevus       Pink pearly papule/plaque c/w basal cell carcinoma      Erythematous hyperkeratotic cursted plaque c/w SCC      Surgical scar with no sign of skin cancer recurrence      Open and closed comedones      Inflammatory papules and pustules      Verrucoid papule consistent consistent with wart     Erythematous eczematous patches and plaques     Dystrophic onycholytic nail with subungual debris c/w onychomycosis     Umbilicated papule    Erythematous-base heme-crusted tan verrucoid plaque consistent with inflamed seborrheic keratosis     Erythematous Silvery Scaling Plaque c/w Psoriasis     See annotation      Assessment / Plan:      Pathology Orders:     Normal Orders This Visit    Specimen to Pathology, Dermatology     Questions:    Procedure Type: Dermatology and skin neoplasms    Number of Specimens: 2    ------------------------: -------------------------    Spec 1 Procedure: Biopsy    Spec 1 Clinical Impression: r/o atypical nevus    Spec 1 Source: infra-umbilical    ------------------------: -------------------------    Spec 2 Procedure: Biopsy    Spec 2 Clinical Impression: r/o atypical nevus    Spec 2 Source: left abdomen        Neoplasm of uncertain behavior of skin  -     Specimen to Pathology, Dermatology    Shave biopsy procedure note:    Shave biopsies x 2 performed after verbal consent including risk of infection, scar, recurrence, need for additional treatment of site. Area prepped with alcohol, anesthetized with approximately 1.0cc of 1% lidocaine with epinephrine. Lesional tissue shaved with razor blade. Hemostasis achieved with application of aluminum chloride  followed by hyfrecation. No complications. Dressing applied. Wound care explained.        Multiple benign nevi - Patient with several mildly atypical nevi. Instructed patient to observe lesion(s) for changes and follow up in clinic if changes are noted. Discussed ABCDE's of moles and brochure provided.      Solar lentigo - This is a benign hyperpigmented sun induced lesion. Daily sun protection will reduce the number of new lesions. Treatment of these benign lesions are considered cosmetic.      SK (seborrheic keratosis) - These are benign inherited growths without a malignant potential. Reassurance given to patient. No treatment is necessary.       Family history of melanoma - Total body skin examination performed today including at least 12 points as noted in physical examination. No lesions suspicious for malignancy noted aside from 2 pigmented lesions biopsied today.    Onychomycosis - Kerydin topical soln daily           No follow-ups on file.

## 2020-09-15 LAB
FINAL PATHOLOGIC DIAGNOSIS: NORMAL
GROSS: NORMAL
MICROSCOPIC EXAM: NORMAL

## 2020-09-29 ENCOUNTER — TELEPHONE (OUTPATIENT)
Dept: DERMATOLOGY | Facility: CLINIC | Age: 40
End: 2020-09-29

## 2020-09-29 NOTE — TELEPHONE ENCOUNTER
Called pt. Left  about path results , tried calling last week as well ----- Message from Loli Jimenes sent at 9/29/2020  8:35 AM CDT -----  Regarding: pt  BV -pt- pt is returning the nurses from Wed of last week about his test results can you please call pt at [phone].    SHANON

## 2020-10-01 ENCOUNTER — PATIENT MESSAGE (OUTPATIENT)
Dept: DERMATOLOGY | Facility: CLINIC | Age: 40
End: 2020-10-01

## 2021-04-28 ENCOUNTER — PATIENT MESSAGE (OUTPATIENT)
Dept: RESEARCH | Facility: HOSPITAL | Age: 41
End: 2021-04-28

## 2021-08-19 ENCOUNTER — LAB VISIT (OUTPATIENT)
Dept: LAB | Facility: HOSPITAL | Age: 41
End: 2021-08-19
Attending: INTERNAL MEDICINE
Payer: COMMERCIAL

## 2021-08-19 ENCOUNTER — OFFICE VISIT (OUTPATIENT)
Dept: INTERNAL MEDICINE | Facility: CLINIC | Age: 41
End: 2021-08-19
Payer: COMMERCIAL

## 2021-08-19 VITALS
DIASTOLIC BLOOD PRESSURE: 70 MMHG | BODY MASS INDEX: 26.63 KG/M2 | SYSTOLIC BLOOD PRESSURE: 120 MMHG | HEART RATE: 85 BPM | WEIGHT: 218.69 LBS | OXYGEN SATURATION: 95 % | HEIGHT: 76 IN

## 2021-08-19 DIAGNOSIS — R11.0 NAUSEA: ICD-10-CM

## 2021-08-19 DIAGNOSIS — R23.2 FLUSHING: ICD-10-CM

## 2021-08-19 DIAGNOSIS — R61 SWEAT, SWEATING, EXCESSIVE: ICD-10-CM

## 2021-08-19 DIAGNOSIS — R23.2 FLUSHING: Primary | ICD-10-CM

## 2021-08-19 LAB
ALBUMIN SERPL BCP-MCNC: 3.7 G/DL (ref 3.5–5.2)
ALP SERPL-CCNC: 184 U/L (ref 55–135)
ALT SERPL W/O P-5'-P-CCNC: 149 U/L (ref 10–44)
ANION GAP SERPL CALC-SCNC: 13 MMOL/L (ref 8–16)
AST SERPL-CCNC: 66 U/L (ref 10–40)
BASOPHILS # BLD AUTO: 0.24 K/UL (ref 0–0.2)
BASOPHILS NFR BLD: 2 % (ref 0–1.9)
BILIRUB SERPL-MCNC: 0.6 MG/DL (ref 0.1–1)
BILIRUB UR QL STRIP: NEGATIVE
BUN SERPL-MCNC: 12 MG/DL (ref 6–20)
CALCIUM SERPL-MCNC: 9.5 MG/DL (ref 8.7–10.5)
CHLORIDE SERPL-SCNC: 98 MMOL/L (ref 95–110)
CLARITY UR REFRACT.AUTO: CLEAR
CO2 SERPL-SCNC: 25 MMOL/L (ref 23–29)
COLOR UR AUTO: YELLOW
CREAT SERPL-MCNC: 1.1 MG/DL (ref 0.5–1.4)
DIFFERENTIAL METHOD: ABNORMAL
EOSINOPHIL # BLD AUTO: 0.4 K/UL (ref 0–0.5)
EOSINOPHIL NFR BLD: 3.4 % (ref 0–8)
ERYTHROCYTE [DISTWIDTH] IN BLOOD BY AUTOMATED COUNT: 12.3 % (ref 11.5–14.5)
EST. GFR  (AFRICAN AMERICAN): >60 ML/MIN/1.73 M^2
EST. GFR  (NON AFRICAN AMERICAN): >60 ML/MIN/1.73 M^2
GLUCOSE SERPL-MCNC: 90 MG/DL (ref 70–110)
GLUCOSE UR QL STRIP: NEGATIVE
HCT VFR BLD AUTO: 48.7 % (ref 40–54)
HGB BLD-MCNC: 15.7 G/DL (ref 14–18)
HGB UR QL STRIP: NEGATIVE
IMM GRANULOCYTES # BLD AUTO: 0.05 K/UL (ref 0–0.04)
IMM GRANULOCYTES NFR BLD AUTO: 0.4 % (ref 0–0.5)
KETONES UR QL STRIP: NEGATIVE
LEUKOCYTE ESTERASE UR QL STRIP: NEGATIVE
LYMPHOCYTES # BLD AUTO: 5.7 K/UL (ref 1–4.8)
LYMPHOCYTES NFR BLD: 46.8 % (ref 18–48)
MCH RBC QN AUTO: 28.9 PG (ref 27–31)
MCHC RBC AUTO-ENTMCNC: 32.2 G/DL (ref 32–36)
MCV RBC AUTO: 90 FL (ref 82–98)
MICROSCOPIC COMMENT: NORMAL
MONOCYTES # BLD AUTO: 1.7 K/UL (ref 0.3–1)
MONOCYTES NFR BLD: 13.5 % (ref 4–15)
NEUTROPHILS # BLD AUTO: 4.1 K/UL (ref 1.8–7.7)
NEUTROPHILS NFR BLD: 33.9 % (ref 38–73)
NITRITE UR QL STRIP: NEGATIVE
NRBC BLD-RTO: 0 /100 WBC
PH UR STRIP: 7 [PH] (ref 5–8)
PLATELET # BLD AUTO: 378 K/UL (ref 150–450)
PMV BLD AUTO: 9.5 FL (ref 9.2–12.9)
POTASSIUM SERPL-SCNC: 4.3 MMOL/L (ref 3.5–5.1)
PROT SERPL-MCNC: 7.5 G/DL (ref 6–8.4)
PROT UR QL STRIP: NEGATIVE
RBC # BLD AUTO: 5.44 M/UL (ref 4.6–6.2)
SODIUM SERPL-SCNC: 136 MMOL/L (ref 136–145)
SP GR UR STRIP: 1.02 (ref 1–1.03)
TSH SERPL DL<=0.005 MIU/L-ACNC: 1.41 UIU/ML (ref 0.4–4)
URN SPEC COLLECT METH UR: NORMAL
WBC # BLD AUTO: 12.19 K/UL (ref 3.9–12.7)

## 2021-08-19 PROCEDURE — 99999 PR PBB SHADOW E&M-EST. PATIENT-LVL III: ICD-10-PCS | Mod: PBBFAC,,, | Performed by: INTERNAL MEDICINE

## 2021-08-19 PROCEDURE — 99999 PR PBB SHADOW E&M-EST. PATIENT-LVL III: CPT | Mod: PBBFAC,,, | Performed by: INTERNAL MEDICINE

## 2021-08-19 PROCEDURE — 99204 PR OFFICE/OUTPT VISIT, NEW, LEVL IV, 45-59 MIN: ICD-10-PCS | Mod: S$GLB,,, | Performed by: INTERNAL MEDICINE

## 2021-08-19 PROCEDURE — 85025 COMPLETE CBC W/AUTO DIFF WBC: CPT | Performed by: INTERNAL MEDICINE

## 2021-08-19 PROCEDURE — 36415 COLL VENOUS BLD VENIPUNCTURE: CPT | Performed by: INTERNAL MEDICINE

## 2021-08-19 PROCEDURE — 81001 URINALYSIS AUTO W/SCOPE: CPT | Performed by: INTERNAL MEDICINE

## 2021-08-19 PROCEDURE — 99204 OFFICE O/P NEW MOD 45 MIN: CPT | Mod: S$GLB,,, | Performed by: INTERNAL MEDICINE

## 2021-08-19 PROCEDURE — 84443 ASSAY THYROID STIM HORMONE: CPT | Performed by: INTERNAL MEDICINE

## 2021-08-19 PROCEDURE — 80053 COMPREHEN METABOLIC PANEL: CPT | Performed by: INTERNAL MEDICINE

## 2021-08-19 RX ORDER — ESCITALOPRAM OXALATE 10 MG/1
TABLET, FILM COATED ORAL
COMMUNITY
Start: 2017-10-02

## 2021-08-19 RX ORDER — GABAPENTIN 300 MG/1
600 CAPSULE ORAL 3 TIMES DAILY
COMMUNITY
Start: 2021-08-06

## 2021-08-19 RX ORDER — BUPROPION HYDROCHLORIDE 100 MG/1
TABLET ORAL
COMMUNITY
Start: 2017-10-02

## 2021-08-21 ENCOUNTER — PATIENT MESSAGE (OUTPATIENT)
Dept: INTERNAL MEDICINE | Facility: CLINIC | Age: 41
End: 2021-08-21

## 2021-08-21 DIAGNOSIS — R61 NIGHT SWEATS: ICD-10-CM

## 2021-08-21 DIAGNOSIS — R53.83 FATIGUE, UNSPECIFIED TYPE: ICD-10-CM

## 2021-08-21 DIAGNOSIS — R74.01 TRANSAMINITIS: Primary | ICD-10-CM

## 2021-08-23 ENCOUNTER — TELEPHONE (OUTPATIENT)
Dept: INTERNAL MEDICINE | Facility: CLINIC | Age: 41
End: 2021-08-23

## 2021-08-23 ENCOUNTER — PATIENT MESSAGE (OUTPATIENT)
Dept: INTERNAL MEDICINE | Facility: CLINIC | Age: 41
End: 2021-08-23

## 2021-08-24 DIAGNOSIS — R74.01 TRANSAMINITIS: ICD-10-CM

## 2021-08-24 DIAGNOSIS — Z12.5 PROSTATE CANCER SCREENING: Primary | ICD-10-CM

## 2021-08-24 DIAGNOSIS — E78.5 HYPERLIPIDEMIA, UNSPECIFIED HYPERLIPIDEMIA TYPE: ICD-10-CM

## 2021-08-26 ENCOUNTER — LAB VISIT (OUTPATIENT)
Dept: LAB | Facility: HOSPITAL | Age: 41
End: 2021-08-26
Payer: COMMERCIAL

## 2021-08-26 DIAGNOSIS — R53.83 FATIGUE, UNSPECIFIED TYPE: ICD-10-CM

## 2021-08-26 DIAGNOSIS — R61 NIGHT SWEATS: ICD-10-CM

## 2021-08-26 DIAGNOSIS — Z12.5 PROSTATE CANCER SCREENING: ICD-10-CM

## 2021-08-26 DIAGNOSIS — R74.01 TRANSAMINITIS: ICD-10-CM

## 2021-08-26 DIAGNOSIS — E78.5 HYPERLIPIDEMIA, UNSPECIFIED HYPERLIPIDEMIA TYPE: ICD-10-CM

## 2021-08-26 LAB
CHOLEST SERPL-MCNC: 213 MG/DL (ref 120–199)
CHOLEST/HDLC SERPL: 7.3 {RATIO} (ref 2–5)
COMPLEXED PSA SERPL-MCNC: 0.32 NG/ML (ref 0–4)
HDLC SERPL-MCNC: 29 MG/DL (ref 40–75)
HDLC SERPL: 13.6 % (ref 20–50)
LDLC SERPL CALC-MCNC: 147.8 MG/DL (ref 63–159)
NONHDLC SERPL-MCNC: 184 MG/DL
TRIGL SERPL-MCNC: 181 MG/DL (ref 30–150)

## 2021-08-26 PROCEDURE — 86644 CMV ANTIBODY: CPT | Performed by: INTERNAL MEDICINE

## 2021-08-26 PROCEDURE — 84153 ASSAY OF PSA TOTAL: CPT | Performed by: INTERNAL MEDICINE

## 2021-08-26 PROCEDURE — 87389 HIV-1 AG W/HIV-1&-2 AB AG IA: CPT | Performed by: INTERNAL MEDICINE

## 2021-08-26 PROCEDURE — 80061 LIPID PANEL: CPT | Performed by: INTERNAL MEDICINE

## 2021-08-26 PROCEDURE — 86665 EPSTEIN-BARR CAPSID VCA: CPT | Performed by: INTERNAL MEDICINE

## 2021-08-26 PROCEDURE — 86645 CMV ANTIBODY IGM: CPT | Performed by: INTERNAL MEDICINE

## 2021-08-26 PROCEDURE — 80074 ACUTE HEPATITIS PANEL: CPT | Performed by: INTERNAL MEDICINE

## 2021-08-27 LAB
CMV IGG SERPL QL IA: REACTIVE
HAV IGM SERPL QL IA: NEGATIVE
HBV CORE IGM SERPL QL IA: NEGATIVE
HBV SURFACE AG SERPL QL IA: NEGATIVE
HCV AB SERPL QL IA: NEGATIVE
HIV 1+2 AB+HIV1 P24 AG SERPL QL IA: NEGATIVE

## 2021-08-30 LAB
CMV IGM SERPL IA-ACNC: >240 AU/ML
EBV EA IGG SER-ACNC: 11.8 U/ML
EBV NA IGG SER-ACNC: 283 U/ML
EBV VCA IGG SER-ACNC: >750 U/ML
EBV VCA IGM SER-ACNC: >160 U/ML

## 2021-09-22 DIAGNOSIS — R74.01 TRANSAMINITIS: Primary | ICD-10-CM

## 2021-09-23 ENCOUNTER — PATIENT MESSAGE (OUTPATIENT)
Dept: INTERNAL MEDICINE | Facility: CLINIC | Age: 41
End: 2021-09-23

## 2021-09-23 ENCOUNTER — TELEPHONE (OUTPATIENT)
Dept: INTERNAL MEDICINE | Facility: CLINIC | Age: 41
End: 2021-09-23

## 2021-09-24 ENCOUNTER — LAB VISIT (OUTPATIENT)
Dept: LAB | Facility: HOSPITAL | Age: 41
End: 2021-09-24
Attending: INTERNAL MEDICINE
Payer: COMMERCIAL

## 2021-09-24 DIAGNOSIS — R74.01 TRANSAMINITIS: ICD-10-CM

## 2021-09-24 LAB
ALBUMIN SERPL BCP-MCNC: 4.4 G/DL (ref 3.5–5.2)
ALP SERPL-CCNC: 69 U/L (ref 55–135)
ALT SERPL W/O P-5'-P-CCNC: 46 U/L (ref 10–44)
AST SERPL-CCNC: 27 U/L (ref 10–40)
BILIRUB DIRECT SERPL-MCNC: 0.1 MG/DL (ref 0.1–0.3)
BILIRUB SERPL-MCNC: 0.5 MG/DL (ref 0.1–1)
PROT SERPL-MCNC: 7.9 G/DL (ref 6–8.4)

## 2021-09-24 PROCEDURE — 80076 HEPATIC FUNCTION PANEL: CPT | Performed by: INTERNAL MEDICINE

## 2021-09-24 PROCEDURE — 36415 COLL VENOUS BLD VENIPUNCTURE: CPT | Performed by: INTERNAL MEDICINE

## 2021-09-25 ENCOUNTER — TELEPHONE (OUTPATIENT)
Dept: INTERNAL MEDICINE | Facility: CLINIC | Age: 41
End: 2021-09-25

## 2021-09-27 ENCOUNTER — PATIENT MESSAGE (OUTPATIENT)
Dept: INTERNAL MEDICINE | Facility: CLINIC | Age: 41
End: 2021-09-27

## 2021-12-25 ENCOUNTER — HOSPITAL ENCOUNTER (EMERGENCY)
Facility: HOSPITAL | Age: 41
Discharge: HOME | End: 2021-12-25
Attending: EMERGENCY MEDICINE
Payer: COMMERCIAL

## 2021-12-25 VITALS
OXYGEN SATURATION: 99 % | HEIGHT: 76 IN | HEART RATE: 72 BPM | BODY MASS INDEX: 26.55 KG/M2 | DIASTOLIC BLOOD PRESSURE: 82 MMHG | WEIGHT: 218 LBS | RESPIRATION RATE: 20 BRPM | SYSTOLIC BLOOD PRESSURE: 136 MMHG | TEMPERATURE: 98 F

## 2021-12-25 DIAGNOSIS — S41.119A LACERATION OF ARM: ICD-10-CM

## 2021-12-25 PROCEDURE — 90471 IMMUNIZATION ADMIN: CPT | Performed by: NURSE PRACTITIONER

## 2021-12-25 PROCEDURE — 12002 RPR S/N/AX/GEN/TRNK2.6-7.5CM: CPT

## 2021-12-25 PROCEDURE — 63600175 PHARM REV CODE 636 W HCPCS: Performed by: NURSE PRACTITIONER

## 2021-12-25 PROCEDURE — 99284 EMERGENCY DEPT VISIT MOD MDM: CPT | Mod: 25

## 2021-12-25 PROCEDURE — 90715 TDAP VACCINE 7 YRS/> IM: CPT | Performed by: NURSE PRACTITIONER

## 2021-12-25 PROCEDURE — 25000003 PHARM REV CODE 250: Performed by: NURSE PRACTITIONER

## 2021-12-25 RX ORDER — MUPIROCIN 20 MG/G
OINTMENT TOPICAL DAILY
Qty: 1 G | Refills: 0 | Status: SHIPPED | OUTPATIENT
Start: 2021-12-25 | End: 2022-08-05

## 2021-12-25 RX ORDER — MUPIROCIN 20 MG/G
1 OINTMENT TOPICAL
Status: COMPLETED | OUTPATIENT
Start: 2021-12-25 | End: 2021-12-25

## 2021-12-25 RX ORDER — LIDOCAINE HYDROCHLORIDE 10 MG/ML
10 INJECTION INFILTRATION; PERINEURAL
Status: COMPLETED | OUTPATIENT
Start: 2021-12-25 | End: 2021-12-25

## 2021-12-25 RX ORDER — CEPHALEXIN 500 MG/1
500 CAPSULE ORAL 4 TIMES DAILY
Qty: 20 CAPSULE | Refills: 0 | Status: SHIPPED | OUTPATIENT
Start: 2021-12-25 | End: 2021-12-28

## 2021-12-25 RX ADMIN — LIDOCAINE HYDROCHLORIDE 10 ML: 10 INJECTION, SOLUTION INFILTRATION; PERINEURAL at 06:12

## 2021-12-25 RX ADMIN — TETANUS TOXOID, REDUCED DIPHTHERIA TOXOID AND ACELLULAR PERTUSSIS VACCINE, ADSORBED 0.5 ML: 5; 2.5; 8; 8; 2.5 SUSPENSION INTRAMUSCULAR at 06:12

## 2021-12-25 RX ADMIN — MUPIROCIN 22 G: 20 OINTMENT TOPICAL at 08:12
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
## 2021-12-26 NOTE — ED NOTES
Ana Rojas NP did sutured. Pt tolerated it well. Applied mupirocin in pt right arm wound and abrasion hand and fingers. Applied mepelex and ace wrap in pt right arm. Educated pt how to take care wound and dressing, verbalized understanding.

## 2021-12-26 NOTE — ED NOTES
Cleaned/flushed wound with wound cleanser and normal saline, painted with betadine. Pt tolerated it well.

## 2022-08-05 ENCOUNTER — OFFICE VISIT (OUTPATIENT)
Dept: DERMATOLOGY | Facility: CLINIC | Age: 42
End: 2022-08-05
Payer: COMMERCIAL

## 2022-08-05 DIAGNOSIS — B35.1 ONYCHOMYCOSIS: ICD-10-CM

## 2022-08-05 DIAGNOSIS — L21.9 SEBORRHEIC DERMATITIS: ICD-10-CM

## 2022-08-05 DIAGNOSIS — B35.3 TINEA PEDIS OF BOTH FEET: ICD-10-CM

## 2022-08-05 DIAGNOSIS — Z79.899 LONG-TERM USE OF HIGH-RISK MEDICATION: ICD-10-CM

## 2022-08-05 DIAGNOSIS — D48.5 NEOPLASM OF UNCERTAIN BEHAVIOR OF SKIN: Primary | ICD-10-CM

## 2022-08-05 PROCEDURE — 88305 TISSUE EXAM BY PATHOLOGIST: CPT | Performed by: PATHOLOGY

## 2022-08-05 PROCEDURE — 11302 PR SHAV SKIN LES 1.1-2.0 CM TRUNK,ARM,LEG: ICD-10-PCS | Mod: S$GLB,,, | Performed by: DERMATOLOGY

## 2022-08-05 PROCEDURE — 11302 SHAVE SKIN LESION 1.1-2.0 CM: CPT | Mod: S$GLB,,, | Performed by: DERMATOLOGY

## 2022-08-05 PROCEDURE — 88305 TISSUE EXAM BY PATHOLOGIST: CPT | Mod: 26,,, | Performed by: PATHOLOGY

## 2022-08-05 PROCEDURE — 99214 OFFICE O/P EST MOD 30 MIN: CPT | Mod: 25,S$GLB,, | Performed by: DERMATOLOGY

## 2022-08-05 PROCEDURE — 99214 PR OFFICE/OUTPT VISIT, EST, LEVL IV, 30-39 MIN: ICD-10-PCS | Mod: 25,S$GLB,, | Performed by: DERMATOLOGY

## 2022-08-05 PROCEDURE — 88305 TISSUE EXAM BY PATHOLOGIST: ICD-10-PCS | Mod: 26,,, | Performed by: PATHOLOGY

## 2022-08-05 RX ORDER — TERBINAFINE HYDROCHLORIDE 250 MG/1
250 TABLET ORAL DAILY
Qty: 30 TABLET | Refills: 2 | Status: SHIPPED | OUTPATIENT
Start: 2022-08-05 | End: 2022-09-04

## 2022-08-05 RX ORDER — MOXIFLOXACIN 5 MG/ML
SOLUTION/ DROPS OPHTHALMIC
COMMUNITY
Start: 2022-07-27

## 2022-08-05 RX ORDER — KETOCONAZOLE 20 MG/G
CREAM TOPICAL
Qty: 60 G | Refills: 5 | Status: SHIPPED | OUTPATIENT
Start: 2022-08-05

## 2022-08-05 NOTE — PROGRESS NOTES
Patient Information  Name: Ramakrishna Vargas  : 1980  MRN: [id number]     Referring Physician:  No ref. provider found   Primary Care Physician:  Primary Doctor No   Date of Visit: 2022      Subjective:     History of Present lllness:    Ramakrishna Vargas is a 42 y.o. male who presents with a chief complaint of mole.    Location: back  Duration: most of life  Signs/Symptoms: wife states it may have grown  Relieving factors/Prior treatments: none    Location: eyebrows  Duration: 6 months  Signs/Symptoms: dry flaking  Relieving factors/Prior treatments: lotion     Location: toenails  Duration: years  Signs/Symptoms: fungus, worsening  Relieving factors/Prior treatments: Kerydin    Clinical documentation obtained by nursing staff reviewed.    Review of Systems   Skin: Negative for daily sunscreen use and activity-related sunscreen use.       Objective:   Physical Exam   Constitutional: He appears well-developed and well-nourished. No distress.   Neurological: He is alert and oriented to person, place, and time. He is not disoriented.   Psychiatric: He has a normal mood and affect.   Skin:   Areas Examined (abnormalities noted in diagram):   Scalp / Hair Palpated and Inspected  Head / Face Inspection Performed  Back Inspection Performed  RLE Inspected  LLE Inspection Performed  Nails and Digits Inspection Performed                     Diagram Legend     Erythematous scaling macule/papule c/w actinic keratosis       Vascular papule c/w angioma      Pigmented verrucoid papule/plaque c/w seborrheic keratosis      Yellow umbilicated papule c/w sebaceous hyperplasia      Irregularly shaped tan macule c/w lentigo     1-2 mm smooth white papules consistent with Milia      Movable subcutaneous cyst with punctum c/w epidermal inclusion cyst      Subcutaneous movable cyst c/w pilar cyst      Firm pink to brown papule c/w dermatofibroma      Pedunculated fleshy papule(s) c/w skin tag(s)      Evenly pigmented macule c/w  junctional nevus     Mildly variegated pigmented, slightly irregular-bordered macule c/w mildly atypical nevus      Flesh colored to evenly pigmented papule c/w intradermal nevus       Pink pearly papule/plaque c/w basal cell carcinoma      Erythematous hyperkeratotic cursted plaque c/w SCC      Surgical scar with no sign of skin cancer recurrence      Open and closed comedones      Inflammatory papules and pustules      Verrucoid papule consistent consistent with wart     Erythematous eczematous patches and plaques     Dystrophic onycholytic nail with subungual debris c/w onychomycosis     Umbilicated papule    Erythematous-base heme-crusted tan verrucoid plaque consistent with inflamed seborrheic keratosis     Erythematous Silvery Scaling Plaque c/w Psoriasis     See annotation            [] Data reviewed  [] Prior external notes reviewed  [] Independent review of test  [] Management discussed with another provider  [] Independent historian    Assessment / Plan:      Pathology Orders:     Normal Orders This Visit    Specimen to Pathology, Dermatology     Comments:    Number of Specimens:->1  ------------------------->-------------------------  Spec 1 Procedure:->Biopsy  Spec 1 Clinical Impression:->r/o irritated nevus vs  dysplastic nevus  Spec 1 Source:->right upper back    Questions:    Procedure Type: Dermatology and skin neoplasms    Number of Specimens: 1    ------------------------: -------------------------    Spec 1 Procedure: Biopsy    Spec 1 Clinical Impression: r/o irritated nevus vs dysplastic nevus    Spec 1 Source: right upper back    Release to patient:         Neoplasm of uncertain behavior of skin  -     Specimen to Pathology, Dermatology    Shave removal procedure note:  Risk, benefits, and alternatives of shave removal are discussed with the patient, including risk of infection, scar, recurrence, and need for additional treatment of site. The patient agrees to the procedure by verbal consent.  The area is marked and prepped with alcohol.  Approximately 1 mL of lidocaine 1% with epinephrine is used for local anesthesia. A sharp blade is used to remove the entire lesion with a minimal margin of normal-appearing skin. The specimen is sent to pathology for histologic confirmation. Hemostasis is obtained with aluminum chloride and/or monopolar hyfrecation if needed. The area is then dressed and bandaged. The patient tolerated the procedure well without adverse event. Written instructions on wound care were given and were reviewed with the patient, who is to call for any signs of bleeding or infection. The patient will be notified of the pathology results.  Size of lesion: 11 x 10 mm    Onychomycosis  Tinea pedis of both feet  Long-term use of high-risk medication  - chronic problem with exacerbation/progression  Discussed treatment options with the patient, including risks, benefits, and side effects of oral Lamisil (75% of patients clear but 50% recur within 2 years, hepatotoxicity) vs. topical treatments. Discussed common side effects may include headache, skin rash, GI upset, and taste disturbances. Rare side effects may include severe skin rash or hepatitis.  Discussed with patient that condition may recur after clearance with the medication. Patient elected to proceed with oral Lamisil treatment.     Will check baseline labs and after 6 weeks of use. Lamisil may interfere with certain medications including tricyclic antidepressants, cimetidine, rifampin. Minimize alcohol intake while on Lamisil, and stop statin medications if cholesterol is within normal limits.     -     terbinafine HCL (LAMISIL) 250 mg tablet; Take 1 tablet (250 mg total) by mouth once daily.  Dispense: 30 tablet; Refill: 2  -     Comprehensive Metabolic Panel; Future; Expected date: 08/05/2022  -     CBC Auto Differential; Future; Expected date: 08/05/2022  -     Comprehensive Metabolic Panel; Future; Expected date:  09/16/2022    Seborrheic dermatitis  - chronic problem, not at treatment goal  Seborrheic dermatitis is a common skin condition that usually lasts for years. It may be caused by multiple factors, including the yeast that normally lives on our skin, our genes, a cold and dry climate, stress, and a persons overall health.  -     ketoconazole (NIZORAL) 2 % cream; Apply to affected areas of face BID prn scaling.  Dispense: 60 g; Refill: 5      Follow up in about 6 months (around 2/5/2023) for follow up, or sooner dependent on pathology results, or if symptoms worsening or not improving.      Natalie Carmona MD, FAAD  [facility]

## 2022-08-05 NOTE — PATIENT INSTRUCTIONS
Biopsy Wound Care Instructions    Leave the bandage on for 24 hours without getting it wet.   Clean the area once a day with a gentle soap and water, then pat dry and apply Vaseline and a bandaid.  The site should be kept moist with Vaseline at all times to improve healing. Reapply a thick coating as needed. Do not let the site air out or form a scab, as this will delay healing and worsen scarring.  If any bleeding or oozing occurs once you return home, apply firm pressure to the area for 30 minutes straight without peeking. If bleeding continues, call the office immediately.  Please message us via [facility], call us at [phone], or return to the office at any sign of increasing redness, swelling, tenderness, pain, heat, yellow drainage/discharge, or continued bleeding.      Receiving Your Pathology Results    Your pathology results will be released to you on [facility] at the same time that Dr. Carmona receives them.   Dr. Carmona will then message you with her interpretation of the results and/or with the plan going forward.  If you do not use [facility] or if your pathology results require more of an explanation, you will receive your results via a phone call.  If 2 weeks go by and you have not received your results, please message us via [facility] or call us at [phone] to inform us.

## 2022-08-16 LAB
FINAL PATHOLOGIC DIAGNOSIS: NORMAL
GROSS: NORMAL
Lab: NORMAL
MICROSCOPIC EXAM: NORMAL

## 2022-08-16 NOTE — PROGRESS NOTES
Final Pathologic Diagnosis 1. Skin, right upper back, shave biopsy:   - INFLAMED SEBORRHEIC KERATOSIS.

## 2022-10-04 ENCOUNTER — LAB VISIT (OUTPATIENT)
Dept: LAB | Facility: HOSPITAL | Age: 42
End: 2022-10-04
Attending: DERMATOLOGY
Payer: COMMERCIAL

## 2022-10-04 DIAGNOSIS — Z79.899 LONG-TERM USE OF HIGH-RISK MEDICATION: ICD-10-CM

## 2022-10-04 DIAGNOSIS — B35.1 ONYCHOMYCOSIS: ICD-10-CM

## 2023-01-31 ENCOUNTER — OFFICE VISIT (OUTPATIENT)
Dept: SPORTS MEDICINE | Facility: CLINIC | Age: 43
End: 2023-01-31
Payer: COMMERCIAL

## 2023-01-31 ENCOUNTER — HOSPITAL ENCOUNTER (OUTPATIENT)
Dept: RADIOLOGY | Facility: HOSPITAL | Age: 43
Discharge: HOME | End: 2023-01-31
Attending: ORTHOPAEDIC SURGERY
Payer: COMMERCIAL

## 2023-01-31 VITALS
HEART RATE: 64 BPM | SYSTOLIC BLOOD PRESSURE: 138 MMHG | BODY MASS INDEX: 25.33 KG/M2 | DIASTOLIC BLOOD PRESSURE: 86 MMHG | HEIGHT: 76 IN | WEIGHT: 208 LBS

## 2023-01-31 DIAGNOSIS — M54.2 CERVICALGIA: Primary | ICD-10-CM

## 2023-01-31 DIAGNOSIS — M25.512 LEFT SHOULDER PAIN, UNSPECIFIED CHRONICITY: ICD-10-CM

## 2023-01-31 DIAGNOSIS — M25.512 ACUTE PAIN OF LEFT SHOULDER: ICD-10-CM

## 2023-01-31 DIAGNOSIS — G25.89 SCAPULAR DYSKINESIS: ICD-10-CM

## 2023-01-31 PROCEDURE — 3079F PR MOST RECENT DIASTOLIC BLOOD PRESSURE 80-89 MM HG: ICD-10-PCS | Mod: CPTII,S$GLB,, | Performed by: ORTHOPAEDIC SURGERY

## 2023-01-31 PROCEDURE — 3008F BODY MASS INDEX DOCD: CPT | Mod: CPTII,S$GLB,, | Performed by: ORTHOPAEDIC SURGERY

## 2023-01-31 PROCEDURE — 1159F MED LIST DOCD IN RCRD: CPT | Mod: CPTII,S$GLB,, | Performed by: ORTHOPAEDIC SURGERY

## 2023-01-31 PROCEDURE — 3079F DIAST BP 80-89 MM HG: CPT | Mod: CPTII,S$GLB,, | Performed by: ORTHOPAEDIC SURGERY

## 2023-01-31 PROCEDURE — 99204 OFFICE O/P NEW MOD 45 MIN: CPT | Mod: S$GLB,,, | Performed by: ORTHOPAEDIC SURGERY

## 2023-01-31 PROCEDURE — 3075F SYST BP GE 130 - 139MM HG: CPT | Mod: CPTII,S$GLB,, | Performed by: ORTHOPAEDIC SURGERY

## 2023-01-31 PROCEDURE — 99204 PR OFFICE/OUTPT VISIT, NEW, LEVL IV, 45-59 MIN: ICD-10-PCS | Mod: S$GLB,,, | Performed by: ORTHOPAEDIC SURGERY

## 2023-01-31 PROCEDURE — 73030 XR SHOULDER COMPLETE 2 OR MORE VIEWS LEFT: ICD-10-PCS | Mod: 26,LT,, | Performed by: RADIOLOGY

## 2023-01-31 PROCEDURE — 3075F PR MOST RECENT SYSTOLIC BLOOD PRESS GE 130-139MM HG: ICD-10-PCS | Mod: CPTII,S$GLB,, | Performed by: ORTHOPAEDIC SURGERY

## 2023-01-31 PROCEDURE — 73030 X-RAY EXAM OF SHOULDER: CPT | Mod: TC,LT

## 2023-01-31 PROCEDURE — 3008F PR BODY MASS INDEX (BMI) DOCUMENTED: ICD-10-PCS | Mod: CPTII,S$GLB,, | Performed by: ORTHOPAEDIC SURGERY

## 2023-01-31 PROCEDURE — 99999 PR PBB SHADOW E&M-EST. PATIENT-LVL III: ICD-10-PCS | Mod: PBBFAC,,, | Performed by: ORTHOPAEDIC SURGERY

## 2023-01-31 PROCEDURE — 73030 X-RAY EXAM OF SHOULDER: CPT | Mod: 26,LT,, | Performed by: RADIOLOGY

## 2023-01-31 PROCEDURE — 99999 PR PBB SHADOW E&M-EST. PATIENT-LVL III: CPT | Mod: PBBFAC,,, | Performed by: ORTHOPAEDIC SURGERY

## 2023-01-31 PROCEDURE — 1159F PR MEDICATION LIST DOCUMENTED IN MEDICAL RECORD: ICD-10-PCS | Mod: CPTII,S$GLB,, | Performed by: ORTHOPAEDIC SURGERY

## 2023-01-31 RX ORDER — MELOXICAM 15 MG/1
15 TABLET ORAL DAILY
Qty: 28 TABLET | Refills: 0 | Status: SHIPPED | OUTPATIENT
Start: 2023-01-31

## 2023-01-31 NOTE — PROGRESS NOTES
CC: Left shoulder pain     42 y.o. Male presents as a new patient to me. He  works as an . [employer]. Complaint is left shoulder pain x 2.5 weeks after doing a lot of yard work. Pain localizes posteriorly, over the periscapular region with radiation to and from the neck and trapezius. Worse with sleeping on his shoulder. Pain is sometimes disruptive to sleep at night. Better with rest. Denies radicular symptoms down left arm otherwise. Treatment thus far has included activity modifications, rest, and oral medication.  Here today to discuss diagnosis and treatment options.     The patient does report prior similar such symptoms in the past but views this as a separate issue.  He reports going a few years recently without any problems in the shoulder.    Pain Score:   6    PAST MEDICAL HISTORY:   History reviewed. No pertinent past medical history.    PAST SURGICAL HISTORY:  History reviewed. No pertinent surgical history.    FAMILY HISTORY:  History reviewed. No pertinent family history.    MEDICATIONS:    Current Outpatient Medications:     gabapentin (NEURONTIN) 300 MG capsule, Take 600 mg by mouth 3 (three) times daily., Disp: , Rfl:     buPROPion (WELLBUTRIN) 100 MG tablet, , Disp: , Rfl:     EScitalopram oxalate (LEXAPRO) 10 MG tablet, , Disp: , Rfl:     fluticasone (FLONASE) 50 mcg/actuation nasal spray, 1 spray (50 mcg total) by Each Nare route 2 (two) times daily., Disp: 1 Bottle, Rfl: 0    ketoconazole (NIZORAL) 2 % cream, Apply to affected areas of face BID prn scaling. (Patient not taking: Reported on 1/31/2023), Disp: 60 g, Rfl: 5    meloxicam (MOBIC) 15 MG tablet, Take 1 tablet (15 mg total) by mouth once daily., Disp: 28 tablet, Rfl: 0    moxifloxacin (VIGAMOX) 0.5 % ophthalmic solution, , Disp: , Rfl:     ALLERGIES:  Review of patient's allergies indicates:  No Known Allergies     REVIEW OF SYSTEMS:  Constitution: Negative. Negative for chills, fever and night sweats.    Hematologic/Lymphatic:  "Negative for bleeding problem. Does not bruise/bleed easily.   Skin: Negative for dry skin, itching and rash.   Musculoskeletal: Negative for falls. Positive for left shoulder and neck pain and muscle weakness.     All other review of symptoms were reviewed and found to be noncontributory.    PHYSICAL EXAMINATION:  Vitals:  /86   Pulse 64   Ht 6' 4" (1.93 m)   Wt 94.3 kg (208 lb)   BMI 25.32 kg/m²    General: Well-developed well-nourished 42 y.o. malein no acute distress   Cardiovascular: Regular rhythm by palpation of distal pulse, normal color and temperature, no concerning varicosities on symptomatic side   Lungs: No labored breathing or wheezing appreciated   Neuro: Alert and oriented ×3   Psychiatric: well oriented to person, place and time, demonstrates normal mood and affect   Skin: No rashes, lesions or ulcers, normal temperature, turgor, and texture on uninvolved extremity    Ortho/SPM Exam  Examination of the left shoulder demonstrates active forward elevation to 180, ER with arm at side to 80, IR to T10. Passive FE to 180, ER to 80. Mild tenderness along posterior glenohumeral joint line.  He localizes more to the medial periscapular region.  Negative AC tenderness. 5/5 resisted supraspinatus testing. 5/5 resisted infraspinatus testing. Negative belly press test. Stable shoulder.  Negative apprehension.  Negative compression rotation test.  Mild scapular dyskinesia.  Mildly positive external impingement findings consistent with pseudo outlet type impingement. Typical pulling pain over the left side of his neck and shoulder provoked with forced rotational neck motion to the right.  Negative Lhermitte sign.  Tenderness to palpation over the left paraspinal and trapezius region.  Full motor and sensory intact to the left upper extremity.    IMAGING:  Xrays including AP, Outlet and Axillary Lateral of Left shoulder are ordered / images reviewed by me:   No significant finding    ASSESSMENT:      " ICD-10-CM ICD-9-CM   1. Cervicalgia  M54.2 723.1   2. Scapular dyskinesis  G25.89 781.3   3. Acute pain of left shoulder  M25.512 719.41       PLAN:     -Findings and treatment options were discussed with the patient.  Pain likely of dual etiology.  Referred from the neck.  No radicular symptoms down the left arm but some referred pain along the medial periscapular border with also underlying scapular dyskinesis.  Conservative treatment recommended.  -Celebrex  -Physical therapy.  Jason.  Work with Sunday Trinidadvishnu.  Focus on periscapular strengthening stabilization and neck therapy.  -RTC as needed.  -All questions answered    Procedures

## 2023-02-06 ENCOUNTER — CLINICAL SUPPORT (OUTPATIENT)
Dept: REHABILITATION | Facility: HOSPITAL | Age: 43
End: 2023-02-06
Payer: COMMERCIAL

## 2023-02-06 DIAGNOSIS — M54.2 NECK PAIN: ICD-10-CM

## 2023-02-06 DIAGNOSIS — M25.512 ACUTE PAIN OF LEFT SHOULDER: ICD-10-CM

## 2023-02-06 PROCEDURE — 97140 MANUAL THERAPY 1/> REGIONS: CPT | Performed by: PHYSICAL THERAPIST

## 2023-02-06 PROCEDURE — 97161 PT EVAL LOW COMPLEX 20 MIN: CPT | Performed by: PHYSICAL THERAPIST

## 2023-02-06 PROCEDURE — 97110 THERAPEUTIC EXERCISES: CPT | Performed by: PHYSICAL THERAPIST

## 2023-02-07 NOTE — PLAN OF CARE
[facility]  Physical Therapy Initial Evaluation    Date: 2/6/2023   Name: Ramakrishna Vargas  Clinic Number: [id number]    Therapy Diagnosis:   Encounter Diagnoses   Name Primary?    Acute pain of left shoulder     Neck pain      Physician: Low Dimas MD    Physician Orders: PT Eval and Treat   Medical Diagnosis from Referral: M25.512 (ICD-10-CM) - Acute pain of left shoulder  Evaluation Date: 2/6/2023  Authorization Period Expiration: 12/31/2023  Plan of Care Expiration: 6/6/2023  Visit # / Visits authorized: 1/ 1    Time In: 1308  Time Out: 1400  Total Appointment Time (timed & untimed codes): 52 minutes    Precautions: Standard    Subjective   Date of onset: couple months   History of current condition - Ramakrishna reports: Patient reports left shoulder pain on top and back of the shoulder. 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 He does not have any radiating pain down the arm. Patient is right hand dominant. This weekend he notes helping a friend move cement and experiencing shoulder pain. Pain this morning was worse than normal.      Medical History:   No past medical history on file.    Surgical History:   Ramakrishna Vargas  has no past surgical history on file.    Medications:   Ramakrishna has a current medication list which includes the following prescription(s): bupropion, lexapro, fluticasone propionate, gabapentin, ketoconazole, meloxicam, and moxifloxacin.    Allergies:   Review of patient's allergies indicates:  No Known Allergies     Imaging, xray:     Impression:     1. No gross abnormalities of the left shoulder joint.  2. Healed left mid clavicular fracture as above.    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 male presenting alert and oriented    Posture: Left scapula downwardly rotated, anterior tilt and internal rotation    Cervical Range of Motion:    Degrees(%) Pain   Flexion 100 -     Extension 100 -     Right Rotation 90 -     Left Rotation 80 -     Right Side Bending 100 -   Left Side Bending 80 -   C0-C1 Flex 100 -   C0-C1 Ext 100 -   C0-C1 Sidebending 100 -   C1-2 Rotation 100 -   C1-3 Rotation 100 -      Shoulder Range of Motion:   Shoulder Left Right   Flexion 160 170   Abduction 160 170   ER at 90 105 110   IR 65 80     Upper Extremity Strength  (R) UE  (L) UE    Shoulder flexion: 5/5 Shoulder flexion: 4+/5   Shoulder Abduction: 5/5 Shoulder abduction: 5/5   Shoulder ER 5/5 Shoulder ER 4+/5   Shoulder IR 4+/5 Shoulder IR 4-/5   Elbow flexion: 5/5 Elbow flexion: 5/5   Elbow extension: 5/5 Elbow extension: 5/5   Wrist flexion: 5/5 Wrist flexion: 5/5   Wrist extension: 5/5 Wrist extension: 5/5    5/5 : 5/5   Lower Trap 4-/5 Lower Trap 3+/5   Middle Trap 4-/5 Middle Trap 3+/5       Special Tests:  Distraction -   Spurlings -   Vertebral Artery -   Luis Fernando -   Lateral Flexion Alar Ligament -   Transverse Ligament -   Shoulder Abduction Test +   Quadrant Testing -     +Hawkin-s Danis, resisted ER, and empty-can testing     Cervical joint mobility: Limited CT junction T1-T2      Reflexes:  -Bicep (C5): 2+  -Brachioradialis (C6): 2+  -Tricep (C7): 2+    Thoracic mobility: Limited gapping L at T1-T2    Palpation: Limited AC/SC joint mobility on the L, previous fracture clavicle.      Sensation: intact    Flexibility: -    Neural tension: -  -ULTT Median: -  -ULTT Ulnar: -  -ULTT Radial: -        Limitation/Restriction for FOTO Shoulder Survey    Therapist reviewed FOTO scores for Ramakrishna Vargas on 2/6/2023.   FOTO documents entered into [employer] - see Media section.             TREATMENT   Treatment Time In: 1330  Treatment Time Out: 1350  Total Treatment time (time-based codes) separate from Evaluation: 20 minutes    Ramakrishna received therapeutic exercises to develop strength, endurance, and ROM for 10 minutes including:  IR/ER walkouts GTB 2 x 10  Prone mid trap 10x    Ramakrishna received the following manual therapy techniques: Joint mobilizations were applied to the: L shoulder and thoracic spine for 10 minutes, including:  Prone thoracic gapping Gr IV  Left shoulder posterior humeral head mob       Home Exercises and Patient Education Provided    Education provided:   - Improve cuff strength, thoracic mobility    Written Home Exercises Provided: yes.  Exercises were reviewed and patient was able to demonstrate them prior to the end of the session.  Patient demonstrated good  understanding of the education provided.     See EMR under Patient Instructions for exercisesprovided 2/6/2023.    Assessment   Ramakrishna is a 42 y.o. male referred to outpatient Physical Therapy with a medical diagnosis of acute left shoulder pain. Pt presents with pain in his left shoulder, pain with impingement testing to the shoulder, scapula dyskinesis, trouble completing work tasks including lifting, and pain when waking up in the morning    Pt prognosis is Excellent.   Pt will benefit from skilled outpatient Physical Therapy to address the deficits stated above and in the chart below, provide pt/family education, and to maximize pt's level of independence.     Plan of care discussed with patient: Yes  Pt's spiritual, cultural and educational needs considered and patient is agreeable to the plan of care and goals as stated below:     Anticipated Barriers for therapy: work schedule     Medical Necessity is demonstrated by the  following  History  Co-morbidities and personal factors that may impact the plan of care Co-morbidities:   level of undertstanding of current condition    Personal Factors:   no deficits     low   Examination  Body Structures and Functions, activity limitations and participation restrictions that may impact the plan of care Body Regions:   neck  upper extremities    Body Systems:    ROM  strength  motor control  motor learning    Participation Restrictions:   covid-19    Activity limitations:   Learning and applying knowledge  no deficits    General Tasks and Commands  no deficits    Communication  no deficits    Mobility  no deficits    Self care  no deficits    Domestic Life  no deficits    Interactions/Relationships  no deficits    Life Areas  no deficits    Community and Social Life  no deficits         low   Clinical Presentation stable and uncomplicated low   Decision Making/ Complexity Score: low     GOALS: Short Term Goals: 4 weeks  1.Report decreased shoulder/neck pain  <   / =  2  /10  to increase tolerance for working around the house  2. Increase cervical ROM by 5-10 degrees in order to perform ADLs with decreased difficulty.  3. Increase strength in B shoulders/scapular stabilizers by 1/3 MMT grade to increase tolerance for ADL and work activities.  4. 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 Increase UE/neck flexibility in order to improve posture.    3.Increased strength in B shoulders/scapular stabilizers to >/= 4/5 MMT grade to increase tolerance for ADL and work activities.  4.  Pt will report at goalson FOTO neck score for neck pain disability to demonstrate decrease in disability and improvement in neck pain.     Plan   Plan of care Certification: 2/6/2023 to 6/6/2023.    Outpatient Physical Therapy 2 times weekly for 10 weeks  to include the following interventions: Manual Therapy, Moist Heat/ Ice, Neuromuscular Re-ed, Patient Education, Self Care, Therapeutic Activities, and Therapeutic Exercise.     Sunday Escudero, PT, DPT, OCS

## 2023-02-16 ENCOUNTER — CLINICAL SUPPORT (OUTPATIENT)
Dept: REHABILITATION | Facility: HOSPITAL | Age: 43
End: 2023-02-16
Payer: COMMERCIAL

## 2023-02-16 DIAGNOSIS — M54.2 NECK PAIN: Primary | ICD-10-CM

## 2023-02-16 PROCEDURE — 97110 THERAPEUTIC EXERCISES: CPT | Performed by: PHYSICAL THERAPIST

## 2023-02-16 PROCEDURE — 97112 NEUROMUSCULAR REEDUCATION: CPT | Performed by: PHYSICAL THERAPIST

## 2023-02-17 ENCOUNTER — PATIENT MESSAGE (OUTPATIENT)
Dept: REHABILITATION | Facility: HOSPITAL | Age: 43
End: 2023-02-17
Payer: COMMERCIAL

## 2024-04-09 ENCOUNTER — LAB VISIT (OUTPATIENT)
Dept: LAB | Facility: HOSPITAL | Age: 44
End: 2024-04-09
Attending: OTOLARYNGOLOGY
Payer: COMMERCIAL

## 2024-04-09 ENCOUNTER — OFFICE VISIT (OUTPATIENT)
Dept: OTOLARYNGOLOGY | Facility: CLINIC | Age: 44
End: 2024-04-09
Payer: COMMERCIAL

## 2024-04-09 VITALS
BODY MASS INDEX: 27.02 KG/M2 | WEIGHT: 222 LBS | HEART RATE: 66 BPM | SYSTOLIC BLOOD PRESSURE: 125 MMHG | DIASTOLIC BLOOD PRESSURE: 85 MMHG

## 2024-04-09 DIAGNOSIS — J34.2 DEVIATED NASAL SEPTUM: ICD-10-CM

## 2024-04-09 DIAGNOSIS — J01.91 ACUTE RECURRENT SINUSITIS, UNSPECIFIED LOCATION: ICD-10-CM

## 2024-04-09 DIAGNOSIS — H61.23 IMPACTED CERUMEN OF BOTH EARS: ICD-10-CM

## 2024-04-09 DIAGNOSIS — J31.0 CHRONIC RHINITIS: Primary | ICD-10-CM

## 2024-04-09 PROCEDURE — 69210 REMOVE IMPACTED EAR WAX UNI: CPT | Mod: 51,S$GLB,, | Performed by: OTOLARYNGOLOGY

## 2024-04-09 PROCEDURE — 99204 OFFICE O/P NEW MOD 45 MIN: CPT | Mod: 25,S$GLB,, | Performed by: OTOLARYNGOLOGY

## 2024-04-09 PROCEDURE — 99999 PR PBB SHADOW E&M-EST. PATIENT-LVL III: CPT | Mod: PBBFAC,,, | Performed by: OTOLARYNGOLOGY

## 2024-04-09 PROCEDURE — 31231 NASAL ENDOSCOPY DX: CPT | Mod: S$GLB,,, | Performed by: OTOLARYNGOLOGY

## 2024-04-09 PROCEDURE — 36415 COLL VENOUS BLD VENIPUNCTURE: CPT | Performed by: OTOLARYNGOLOGY

## 2024-04-09 PROCEDURE — 3074F SYST BP LT 130 MM HG: CPT | Mod: CPTII,S$GLB,, | Performed by: OTOLARYNGOLOGY

## 2024-04-09 PROCEDURE — 86317 IMMUNOASSAY INFECTIOUS AGENT: CPT | Performed by: OTOLARYNGOLOGY

## 2024-04-09 PROCEDURE — 1159F MED LIST DOCD IN RCRD: CPT | Mod: CPTII,S$GLB,, | Performed by: OTOLARYNGOLOGY

## 2024-04-09 PROCEDURE — 3008F BODY MASS INDEX DOCD: CPT | Mod: CPTII,S$GLB,, | Performed by: OTOLARYNGOLOGY

## 2024-04-09 PROCEDURE — 3079F DIAST BP 80-89 MM HG: CPT | Mod: CPTII,S$GLB,, | Performed by: OTOLARYNGOLOGY

## 2024-04-09 PROCEDURE — 1160F RVW MEDS BY RX/DR IN RCRD: CPT | Mod: CPTII,S$GLB,, | Performed by: OTOLARYNGOLOGY

## 2024-04-09 RX ORDER — FLUTICASONE PROPIONATE 50 MCG
2 SPRAY, SUSPENSION (ML) NASAL DAILY
Qty: 16 G | Refills: 2 | Status: SHIPPED | OUTPATIENT
Start: 2024-04-09 | End: 2024-07-08

## 2024-04-09 NOTE — PROGRESS NOTES
Subjective:      Ramakrishna Vargas is a 44 y.o. male who was self-referred for sinusitis.    He relates a 10-day history of upper respiratory symptoms including congestion, facial pressure, and increased nasal discharge.  This also occurred about 2 months ago and occurs typically a couple of times annually.  He is not currently taking antibiotics and believes the symptoms are waning.  At baseline he describes a sporadic thick postnasal drip and mild hyposmia, though denies nasal blockage, rhinorrhea, aural fullness or pruritic symptoms.  He began using flonase about 1 week ago.    Current sinonasal medications as above.  The last course of antibiotics was a long time ago.  He does not regularly use nasal decongestant sprays.    He recalls previously having allergy testing but does not recall the results.  He is an avid  in his spare time.    He denies a history of asthma.    He denies a history of reflux symptoms.    He denies a diagnosis of obstructive sleep apnea.     He has not had sinonasal surgery.  He has not had a tonsillectomy.    He does not recall a prior history of nasal trauma.    SNOT-22 score: : (P) 35  NOSE score:: (P) 40%  ETDQ-7 score:: (P) 1.1      Past Medical History  He has a past medical history of Seasonal allergies.    Past Surgical History  He has no past surgical history on file.    Family History  His family history is not on file.    Social History  He reports that he has never smoked. He has never used smokeless tobacco. He reports current alcohol use. He reports that he does not use drugs.    Allergies  He has No Known Allergies.    Medications   He has a current medication list which includes the following prescription(s): gabapentin, ketoconazole, meloxicam, bupropion, lexapro, fluticasone propionate, and moxifloxacin.    Review of Systems     Constitutional: Positive for fatigue.  Negative for appetite change, chills, fever and unexpected weight loss.      HENT: Positive for  postnasal drip and sore throat.  Negative for ear discharge, ear infection, ear pain, facial swelling, hearing loss, mouth sores, nosebleeds, ringing in the ears, runny nose, sinus infection, sinus pressure, stuffy nose, tonsil infection, dental problems, trouble swallowing and voice change.      Eyes:  Negative for change in eyesight, eye drainage, eye itching and photophobia.     Respiratory:  Positive for cough. Negative for shortness of breath, sleep apnea, snoring and wheezing.      Cardiovascular:  Negative for chest pain, foot swelling, irregular heartbeat and swollen veins.     Gastrointestinal:  Negative for abdominal pain, acid reflux, constipation, diarrhea, heartburn and vomiting.     Genitourinary: Negative for difficulty urinating, sexual problems and frequent urination.     Musc: Positive for back pain. Negative for aching joints, aching muscles and neck pain.     Skin: Negative for rash.     Allergy: Positive for seasonal allergies. Negative for food allergies.     Endocrine: Negative for cold intolerance and heat intolerance.      Neurological: Positive for light-headedness. Negative for dizziness, headaches, seizures and tremors.      Hematologic: Negative for bruises/bleeds easily and swollen glands.      Psychiatric: Negative for decreased concentration, depression, nervous/anxious and sleep disturbance.               Objective:     /85 (BP Location: Right arm, Patient Position: Sitting, BP Method: Large (Automatic))   Pulse 66   Wt 100.7 kg (222 lb 0.1 oz)   BMI 27.02 kg/m²        Constitutional:   He appears well-developed. He is cooperative. Normal speech.  No hoarse voice.      Head:  Normocephalic. Salivary glands normal.  Facial strength is normal.      Ears:    Right Ear: No drainage or tenderness. Tympanic membrane is not perforated. Tympanic membrane mobility is normal. No middle ear effusion. No decreased hearing is noted.   Left Ear: No drainage or tenderness. Tympanic  membrane is not perforated. Tympanic membrane mobility is normal.  No middle ear effusion. No decreased hearing is noted.     Nose:  Mucosal edema and septal deviation present. No rhinorrhea or polyps. No epistaxis. Turbinates normal, no turbinate masses and no turbinate hypertrophy.  Right sinus exhibits no maxillary sinus tenderness and no frontal sinus tenderness. Left sinus exhibits no maxillary sinus tenderness and no frontal sinus tenderness.     Mouth/Throat  Oropharynx clear and moist without lesions or asymmetry and normal uvula midline. He does not have dentures. Normal dentition. No oral lesions or mucous membrane lesions. No oropharyngeal exudate or posterior oropharyngeal erythema. Mirror exam not performed due to patient tolerance.  Mirror exam not performed due to patient tolerance.      Neck:  Neck normal without thyromegaly masses, asymmetry, normal tracheal structure, crepitus, and tenderness, thyroid normal, trachea normal and no adenopathy. Normal range of motion present.     He has no cervical adenopathy.     Cardiovascular:    Regular rhythm.              Pulmonary/Chest:   Effort normal.     Psychiatric:   He has a normal mood and affect. His speech is normal and behavior is normal.     Neurological:   No cranial nerve deficit.     Skin:   No rash noted.       Procedure    Cerumen impaction removed.  See procedure note.    Nasal endoscopy performed.  See procedure note.        Data Reviewed    WBC (K/uL)   Date Value   10/04/2022 5.86     Eosinophil % (%)   Date Value   10/04/2022 1.5     Eos # (K/uL)   Date Value   10/04/2022 0.1     Platelets (K/uL)   Date Value   10/04/2022 301     Glucose (mg/dL)   Date Value   10/04/2022 98     Total IgE (IU/mL)   Date Value   10/04/2022 78       No sinus imaging available.       Assessment:     1. Chronic rhinitis    2. Acute recurrent sinusitis, unspecified location    3. Deviated nasal septum    4. Impacted cerumen of both ears         Plan:     I had  a long discussion with the patient regarding his condition and the further workup and management options.  He appears to have isolated acute sinusitis with suboptimally treated rhinitis that is likely allergic.  I prescribed the daily use of fluticasone propionate to treat sinonasal inflammation.  I ordered serologic testing for S pneumoniae titers to assess for deficient humoral immunity.  If those levels are in the nonprotective range, then a Pneumovax booster shot would be ordered, to be followed 6 to 8 weeks later by repeat testing.  We also discussed appropriate precautions to take while woodworking.  I advised against use of Q-tips and prescribed the use of debrox weekly to prevent cerumen accumulation.    Follow up for test results.

## 2024-04-09 NOTE — PATIENT INSTRUCTIONS
Use Debrox (OTC peroxide drops) weekly to prevent ear wax buildup.    Use protection for your nose during active woodworking.

## 2024-04-09 NOTE — PROCEDURES
Nasal/sinus endoscopy    Date/Time: 4/9/2024 11:00 AM    Performed by: Newton Prescott MD  Authorized by: Newton Prescott MD    Anesthesia:     Local anesthetic:  Lidocaine 4% and Artis-Synephrine 1/2%    Patient tolerance:  Patient tolerated the procedure well with no immediate complications  Nose:     Procedure Performed:  Nasal Endoscopy  External:      No external nasal deformity  Intranasal:      Mucosa no polyps     Mucosa ulcers not present     No mucosa lesions present     Turbinates not enlarged     Septum gross deformity  Nasopharynx:      No mucosa lesions     Adenoids not present     Posterior choanae patent     Eustachian tube not patent     Rightward septal deviation  Scattered nonpurulent mucus on right  No polyps

## 2024-04-09 NOTE — PROCEDURES
Ear Cerumen Removal    Date/Time: 4/9/2024 11:00 AM    Performed by: Newton Prescott MD  Authorized by: Newton Prescott MD    Consent Done?:  Yes (Verbal)    Local anesthetic:  None  Location details:  Both ears  Procedure type: curette    Cerumen  Removal Results:  Cerumen completely removed  Patient tolerance:  Patient tolerated the procedure well with no immediate complications

## 2024-04-18 LAB
DEPRECATED S PNEUM12 IGG SER-MCNC: <0.3 UG/ML
DEPRECATED S PNEUM23 IGG SER-MCNC: 1 UG/ML
DEPRECATED S PNEUM4 IGG SER-MCNC: 0.3 UG/ML
DEPRECATED S PNEUM8 IGG SER-MCNC: <0.3 UG/ML
DEPRECATED S PNEUM9 IGG SER-MCNC: 0.7 UG/ML
S PN DA SERO 19F IGG SER-MCNC: <0.3 UG/ML
S PNEUM DA 1 IGG SER-MCNC: 0.4 UG/ML
S PNEUM DA 14 IGG SER-MCNC: <0.3
S PNEUM DA 18C IGG SER-MCNC: 0.4
S PNEUM DA 3 IGG SER-MCNC: 3.8 UG/ML
S PNEUM DA 5 IGG SER-MCNC: 0.6 UG/ML
S PNEUM DA 6B IGG SER-MCNC: <0.3 UG/ML
S PNEUM DA 7F IGG SER-MCNC: <0.3 UG/ML
S PNEUM DA 9V IGG SER-MCNC: 2.5 UG/ML

## 2024-04-19 ENCOUNTER — PATIENT MESSAGE (OUTPATIENT)
Dept: OTOLARYNGOLOGY | Facility: CLINIC | Age: 44
End: 2024-04-19
Payer: COMMERCIAL

## 2024-04-19 DIAGNOSIS — J01.91 ACUTE RECURRENT SINUSITIS, UNSPECIFIED LOCATION: Primary | ICD-10-CM

## 2024-05-02 ENCOUNTER — TELEPHONE (OUTPATIENT)
Dept: OTOLARYNGOLOGY | Facility: CLINIC | Age: 44
End: 2024-05-02
Payer: COMMERCIAL

## 2024-06-20 ENCOUNTER — LAB VISIT (OUTPATIENT)
Dept: LAB | Facility: OTHER | Age: 44
End: 2024-06-20
Attending: OTOLARYNGOLOGY
Payer: COMMERCIAL

## 2024-06-20 DIAGNOSIS — J01.91 ACUTE RECURRENT SINUSITIS, UNSPECIFIED LOCATION: ICD-10-CM

## 2024-06-20 PROCEDURE — 36415 COLL VENOUS BLD VENIPUNCTURE: CPT | Performed by: OTOLARYNGOLOGY

## 2024-06-27 ENCOUNTER — PATIENT MESSAGE (OUTPATIENT)
Dept: OTOLARYNGOLOGY | Facility: CLINIC | Age: 44
End: 2024-06-27
Payer: COMMERCIAL

## 2024-06-27 LAB
MISCELLANEOUS TEST NAME: NORMAL
REFERENCE LAB: NORMAL
SPECIMEN TYPE: NORMAL
TEST RESULT: NORMAL

## 2024-07-03 ENCOUNTER — HOSPITAL ENCOUNTER (OUTPATIENT)
Dept: RADIOLOGY | Facility: HOSPITAL | Age: 44
Discharge: HOME | End: 2024-07-03
Attending: PHYSICIAN ASSISTANT
Payer: COMMERCIAL

## 2024-07-03 ENCOUNTER — OFFICE VISIT (OUTPATIENT)
Dept: SPORTS MEDICINE | Facility: CLINIC | Age: 44
End: 2024-07-03
Payer: COMMERCIAL

## 2024-07-03 VITALS
HEIGHT: 76 IN | SYSTOLIC BLOOD PRESSURE: 122 MMHG | DIASTOLIC BLOOD PRESSURE: 80 MMHG | HEART RATE: 62 BPM | BODY MASS INDEX: 26.71 KG/M2 | WEIGHT: 219.38 LBS

## 2024-07-03 DIAGNOSIS — M62.838 MUSCLE SPASMS OF NECK: ICD-10-CM

## 2024-07-03 DIAGNOSIS — M25.511 RIGHT SHOULDER PAIN, UNSPECIFIED CHRONICITY: ICD-10-CM

## 2024-07-03 DIAGNOSIS — M25.511 RIGHT SHOULDER PAIN, UNSPECIFIED CHRONICITY: Primary | ICD-10-CM

## 2024-07-03 DIAGNOSIS — M54.2 CERVICALGIA: ICD-10-CM

## 2024-07-03 PROCEDURE — 1160F RVW MEDS BY RX/DR IN RCRD: CPT | Mod: CPTII,S$GLB,, | Performed by: PHYSICIAN ASSISTANT

## 2024-07-03 PROCEDURE — 72040 X-RAY EXAM NECK SPINE 2-3 VW: CPT | Mod: 26,,, | Performed by: RADIOLOGY

## 2024-07-03 PROCEDURE — 99214 OFFICE O/P EST MOD 30 MIN: CPT | Mod: S$GLB,,, | Performed by: PHYSICIAN ASSISTANT

## 2024-07-03 PROCEDURE — 3074F SYST BP LT 130 MM HG: CPT | Mod: CPTII,S$GLB,, | Performed by: PHYSICIAN ASSISTANT

## 2024-07-03 PROCEDURE — 3008F BODY MASS INDEX DOCD: CPT | Mod: CPTII,S$GLB,, | Performed by: PHYSICIAN ASSISTANT

## 2024-07-03 PROCEDURE — 99999 PR PBB SHADOW E&M-EST. PATIENT-LVL IV: CPT | Mod: PBBFAC,,, | Performed by: PHYSICIAN ASSISTANT

## 2024-07-03 PROCEDURE — 3079F DIAST BP 80-89 MM HG: CPT | Mod: CPTII,S$GLB,, | Performed by: PHYSICIAN ASSISTANT

## 2024-07-03 PROCEDURE — 72040 X-RAY EXAM NECK SPINE 2-3 VW: CPT | Mod: TC

## 2024-07-03 PROCEDURE — 73030 X-RAY EXAM OF SHOULDER: CPT | Mod: 26,RT,, | Performed by: RADIOLOGY

## 2024-07-03 PROCEDURE — 1159F MED LIST DOCD IN RCRD: CPT | Mod: CPTII,S$GLB,, | Performed by: PHYSICIAN ASSISTANT

## 2024-07-03 PROCEDURE — 73030 X-RAY EXAM OF SHOULDER: CPT | Mod: TC,RT

## 2024-07-03 RX ORDER — NAPROXEN 500 MG/1
500 TABLET ORAL 2 TIMES DAILY WITH MEALS
Qty: 60 TABLET | Refills: 1 | Status: SHIPPED | OUTPATIENT
Start: 2024-07-03

## 2024-07-03 RX ORDER — METHYLPREDNISOLONE 4 MG/1
TABLET ORAL
Qty: 21 EACH | Refills: 0 | Status: SHIPPED | OUTPATIENT
Start: 2024-07-03 | End: 2024-07-24

## 2024-07-03 RX ORDER — METHOCARBAMOL 500 MG/1
500 TABLET, FILM COATED ORAL 4 TIMES DAILY
Qty: 40 TABLET | Refills: 0 | Status: SHIPPED | OUTPATIENT
Start: 2024-07-03 | End: 2024-07-13

## 2024-07-03 NOTE — PROGRESS NOTES
CC: RIGHT shoulder/neck pain    Patient is a 44-year-old male who presents today for evaluation of right shoulder and right-sided neck pain.  Previously seen by Dr. Beverly for his left shoulder.  He is right-hand dominant and works as an .  Patient states that over the past 4 weeks or so he has been experiencing increased pain in the right periscapular area and right side of his neck.  He denies any specific mechanism of injury or trauma to attribute to this.  1-2 weeks ago he was stretching his shoulders and neck when he felt a pop, and since then has had increased pain.  And is described as a tightness and pulling sensation along the medial aspect of the shoulder blade and trapezius region.  There is some occasional numbness/tingling along the medial side of his elbow and pinky finger.  This does not seem to be improving despite taking naproxen 440 mg 2 to 3 times a day.  He typically sleeps on his back or his side, and notes that pain has been waking him from sleep.  Pain is also worse with neck extension, right and left lateral bending, and left rotation.  Last year when he had a similar problem on the left side he attended physical therapy and took anti-inflammatories, and symptoms gradually improved.  No prior injuries or surgeries on his right shoulder or neck.  He does have a history of left clavicle fracture that was treated conservatively.    Is affecting ADLs.  Pain is 7/10 at it's worst.      Past Medical History:   Diagnosis Date    Seasonal allergies 2015       History reviewed. No pertinent surgical history.    No family history on file.      Current Outpatient Medications:     EScitalopram oxalate (LEXAPRO) 10 MG tablet, , Disp: , Rfl:     fluticasone propionate (FLONASE) 50 mcg/actuation nasal spray, 2 sprays (100 mcg total) by Each Nostril route once daily., Disp: 16 g, Rfl: 2    gabapentin (NEURONTIN) 300 MG capsule, Take 600 mg by mouth 3 (three) times daily., Disp: , Rfl:      "ketoconazole (NIZORAL) 2 % cream, Apply to affected areas of face BID prn scaling., Disp: 60 g, Rfl: 5    buPROPion (WELLBUTRIN) 100 MG tablet, , Disp: , Rfl:     meloxicam (MOBIC) 15 MG tablet, Take 1 tablet (15 mg total) by mouth once daily., Disp: 28 tablet, Rfl: 0    methocarbamoL (ROBAXIN) 500 MG Tab, Take 1 tablet (500 mg total) by mouth 4 (four) times daily. for 10 days, Disp: 40 tablet, Rfl: 0    methylPREDNISolone (MEDROL DOSEPACK) 4 mg tablet, use as directed, Disp: 21 each, Rfl: 0    moxifloxacin (VIGAMOX) 0.5 % ophthalmic solution, , Disp: , Rfl:     naproxen (NAPROSYN) 500 MG tablet, Take 1 tablet (500 mg total) by mouth 2 (two) times daily with meals., Disp: 60 tablet, Rfl: 1    Review of patient's allergies indicates:  No Known Allergies       REVIEW OF SYSTEMS:  Constitution: Negative. Negative for chills, fever and night sweats.   HENT: Negative for congestion and headaches.    Eyes: Negative for blurred vision, left vision loss and right vision loss.   Cardiovascular: Negative for chest pain and syncope.   Respiratory: Negative for cough and shortness of breath.    Endocrine: Negative for polydipsia, polyphagia and polyuria.   Hematologic/Lymphatic: Negative for bleeding problem. Does not bruise/bleed easily.   Skin: Negative for dry skin, itching and rash.   Musculoskeletal: Negative for falls.  Positive for right shoulder pain and muscle weakness.   Gastrointestinal: Negative for abdominal pain and bowel incontinence.   Genitourinary: Negative for bladder incontinence and nocturia.   Neurological: Negative for disturbances in coordination, loss of balance and seizures.   Psychiatric/Behavioral: Negative for depression. The patient does not have insomnia.    Allergic/Immunologic: Negative for hives and persistent infections.      PHYSICAL EXAMINATION:  Vitals:  /80   Pulse 62   Ht 6' 4" (1.93 m)   Wt 99.5 kg (219 lb 5.7 oz)   BMI 26.70 kg/m²    General: The patient is alert and oriented " x 3.  Mood is pleasant.  Observation of ears, eyes and nose reveal no gross abnormalities.  No labored breathing observed.  Gait is coordinated. Patient can toe walk and heel walk without difficulty.      RIGHT SHOULDER / UPPER EXTREMITY EXAM    OBSERVATION:     Swelling  none  Deformity  none   Discoloration  none   Scapular winging none   Scars   none  Atrophy  none    TENDERNESS / CREPITUS (T/C):          T/C      T/C   Clavicle   -/-  SUPRAspinatus    -/-     AC Jt.    -/-  INFRAspinatus  -/-    SC Jt.    -/-  Deltoid    -/-      G. Tuberosity  -/-  LH BICEP groove  -/-   Acromion:  -/-  Midline Neck   -/-     Scapular Spine -/-  Trapezium   -/-   SMA Scapula  -/-  GH jt. line - post  -/-     Scapulothoracic  +/-         ROM: (* = with pain)  Left shoulder   Right shoulder        AROM (PROM)   AROM (PROM)   FE    170° (175°)     170° (175°)     ER at 0°    60°  (65°)    60°*  (65°*)   ER at 90° ABD  90°  (90°)    90°  (90°)   IR at 90°  ABD   70  (70°*)     70*  (70°*)      IR (spine level)   T10     T10*    STRENGTH: (* = with pain) Left shoulder   Right shoulder   SCAPTION   5/5    5/5*    IR    5/5    5/5   ER    5/5    5/5*   BICEPS   5/5    5/5   Deltoid    5/5    5/5     SIGNS:  Painful side       NEER   -    ORISHIS  neg    PURDY   -    SPEEDS  neg     DROP ARM   -   BELLY PRESS neg   Superior escape none    LIFT-OFF  neg   X-Body ADD    neg    MOVING VALGUS neg        STABILITY TESTING    Left shoulder   Right shoulder    Translation     Anterior  up face     up face    Posterior  up face    up face    Sulcus   < 10mm    < 10 mm     Signs   Apprehension   neg      neg       Relocation   no change     no change      Jerk test  neg     neg    EXTREMITY NEURO-VASCULAR EXAM:    Sensation grossly intact to light touch all dermatomal regions.    DTR 2+ Biceps, Triceps, BR and Negative Usmans sign   Grossly intact motor function at Elbow, Wrist and Hand   Distal pulses radial and ulnar 2+, brisk  cap refill, symmetric.      NECK:  Pain with neck extension, right and left lateral bending, and left rotation.  Slightly tender to palpation over the right cervical paraspinal musculature.  Spinous processes non-tender.  Positive Spurlings sign.      OTHER FINDINGS:  + scapular dyskinesia    XRAYS shoulder (07/03/2024):  Xrays including AP, Outlet and Axillary Lateral of shoulder are ordered / images reviewed by me:  No acute fracture or dislocation.  Glenohumeral and acromioclavicular joints appear within normal limits.  Soft tissues appear normal.    X-rays cervical spine (07/03/2024):  No acute fracture or dislocation.  There is some multilevel degenerative change in the cervical spine seen on the lateral view, particularly at C3-C4, C5-C6, and C6-C7.  Soft tissues appear normal.      ASSESSMENT:     Right shoulder/periscapular pain  Right-sided cervicalgia, possible cervical disc herniation  Cervical muscle spasms    PLAN:      I made the decision to obtain old records of the patient including previous notes and imaging. New imaging was ordered today of the extremity or extremities evaluated. I independently reviewed and interpreted the radiographs and/or MRIs today as well as prior imaging, if available.    We discussed at length different treatment options including conservative vs surgical management. These include anti-inflammatories, acetaminophen, rest, ice, heat, formal physical therapy including strengthening and stretching exercises, home exercise programs, dry needling, corticosteroid injections, and finally surgical intervention.      Orders placed for MRI without contrast of the cervical spine to evaluate for possible cervical disc herniation.    Prescriptions for a Medrol Dosepak, Robaxin 500 mg to take 4 times daily as needed for muscle spasms, and naproxen 500 mg to take twice daily as needed for pain and inflammation provided today.  Advised patient to begin taking the Medrol Dosepak tomorrow,  and to refrain from taking any NSAIDs while taking oral steroids.    Follow-up virtually with cervical spine MRI results.      All questions were answered, patient will contact us for questions or concerns in the interim.      Medical Dictation software was used during the dictation of portions or the entirety of this medical record.  Phonetic or grammatic errors may exist due to the use of this software. For clarification, refer to the author of the document.

## 2024-07-08 ENCOUNTER — TELEPHONE (OUTPATIENT)
Dept: SPORTS MEDICINE | Facility: CLINIC | Age: 44
End: 2024-07-08
Payer: COMMERCIAL

## 2024-07-08 ENCOUNTER — PATIENT MESSAGE (OUTPATIENT)
Dept: SPORTS MEDICINE | Facility: CLINIC | Age: 44
End: 2024-07-08
Payer: COMMERCIAL

## 2024-07-08 ENCOUNTER — HOSPITAL ENCOUNTER (OUTPATIENT)
Dept: RADIOLOGY | Facility: HOSPITAL | Age: 44
Discharge: HOME | End: 2024-07-08
Attending: PHYSICIAN ASSISTANT
Payer: COMMERCIAL

## 2024-07-08 DIAGNOSIS — M54.2 CERVICALGIA: ICD-10-CM

## 2024-07-08 DIAGNOSIS — M54.2 CERVICALGIA: Primary | ICD-10-CM

## 2024-07-08 PROCEDURE — 72141 MRI NECK SPINE W/O DYE: CPT | Mod: TC

## 2024-07-08 PROCEDURE — 72141 MRI NECK SPINE W/O DYE: CPT | Mod: 26,,, | Performed by: RADIOLOGY

## 2024-07-10 ENCOUNTER — TELEPHONE (OUTPATIENT)
Dept: PAIN MEDICINE | Facility: CLINIC | Age: 44
End: 2024-07-10
Payer: COMMERCIAL

## 2024-07-10 ENCOUNTER — OFFICE VISIT (OUTPATIENT)
Dept: ORTHOPEDICS | Facility: CLINIC | Age: 44
End: 2024-07-10
Payer: COMMERCIAL

## 2024-07-10 VITALS — BODY MASS INDEX: 26.49 KG/M2 | WEIGHT: 217.5 LBS | HEIGHT: 76 IN

## 2024-07-10 DIAGNOSIS — M54.12 CERVICAL RADICULOPATHY: Primary | ICD-10-CM

## 2024-07-10 DIAGNOSIS — M54.2 CERVICALGIA: ICD-10-CM

## 2024-07-10 PROCEDURE — 99999 PR PBB SHADOW E&M-EST. PATIENT-LVL III: CPT | Mod: PBBFAC,,, | Performed by: ORTHOPAEDIC SURGERY

## 2024-07-10 PROCEDURE — 1159F MED LIST DOCD IN RCRD: CPT | Mod: CPTII,S$GLB,, | Performed by: ORTHOPAEDIC SURGERY

## 2024-07-10 PROCEDURE — 99204 OFFICE O/P NEW MOD 45 MIN: CPT | Mod: S$GLB,,, | Performed by: ORTHOPAEDIC SURGERY

## 2024-07-10 PROCEDURE — 3008F BODY MASS INDEX DOCD: CPT | Mod: CPTII,S$GLB,, | Performed by: ORTHOPAEDIC SURGERY

## 2024-07-10 NOTE — TELEPHONE ENCOUNTER
----- Message from Yasmani Wood MD sent at 7/10/2024  3:46 PM CDT -----  Regarding: Order for ANDREE WALDRON    Patient Name: ANDREE WALDRON([id number])  Sex: Male  : 1980      PCP: FELICIA, PRIMARY DOCTOR    Center: [facility]     Types of orders made on 07/10/2024: Outpatient Referral, Procedure Request    Order Date:7/10/2024  Ordering User:YASMANI WOOD [[id number]]  Encounter Provider:Yasmani Wood MD [[id number]]    Authorizing Provider: Yasmani Wood MD [[id number]]  Department:[facility][[id number]]    Common Order Information  Procedure -> Epidural Injection (specify level) Cmt: cervical interlaminar    Order Specific Information  Order: Procedure Order to Pain Management [Custom: [id number]]  Order #:          [id number]: 1 FUTURE    Priority: STAT  Class: Clinic Performed    Future Order Informa  tion      Expires on:07/10/2025            Expected by:07/10/2024                   Associated Diagnoses      M54.2 Cervicalgia      Physician -> Hanyu         Facility Name: -> [facility]           Priority: STAT  Class: Clinic Performed    Future Order Information      Expires on:07/10/2025            Expected by:07/10/2024                   Associated Diagnoses      M54.2 Cervicalgia      Proc  edure -> Epidural Injection (specify level) Cmt: cervical interlaminar        Physician -> Armandou         Facility Name: -> [facility]

## 2024-07-11 ENCOUNTER — PATIENT MESSAGE (OUTPATIENT)
Dept: ORTHOPEDICS | Facility: CLINIC | Age: 44
End: 2024-07-11
Payer: COMMERCIAL

## 2024-07-11 DIAGNOSIS — M54.2 CERVICALGIA: ICD-10-CM

## 2024-07-11 DIAGNOSIS — M25.511 RIGHT SHOULDER PAIN, UNSPECIFIED CHRONICITY: ICD-10-CM

## 2024-07-11 RX ORDER — METHOCARBAMOL 500 MG/1
1000 TABLET, FILM COATED ORAL 3 TIMES DAILY
Qty: 180 TABLET | Refills: 0 | Status: SHIPPED | OUTPATIENT
Start: 2024-07-11 | End: 2024-08-10

## 2024-07-11 NOTE — TELEPHONE ENCOUNTER
----- Message from Yasmani Wood MD sent at 7/10/2024  3:46 PM CDT -----  Regarding: Order for ANDREE WALDRON    Patient Name: ANDREE WALDRON([id number])  Sex: Male  : 1980      PCP: FELICIA, PRIMARY DOCTOR    Center: [facility]     Types of orders made on 07/10/2024: Outpatient Referral, Procedure Request    Order Date:7/10/2024  Ordering User:YASMANI WOOD [[id number]]  Encounter Provider:Yasmani Wood MD [[id number]]    Authorizing Provider: Yasmani Wood MD [[id number]]  Department:[facility][[id number]]    Common Order Information  Procedure -> Epidural Injection (specify level) Cmt: cervical interlaminar    Order Specific Information  Order: Procedure Order to Pain Management [Custom: [id number]]  Order #:          [id number]: 1 FUTURE    Priority: STAT  Class: Clinic Performed    Future Order Informa  tion      Expires on:07/10/2025            Expected by:07/10/2024                   Associated Diagnoses      M54.2 Cervicalgia      Physician -> Hanyu         Facility Name: -> [facility]           Priority: STAT  Class: Clinic Performed    Future Order Information      Expires on:07/10/2025            Expected by:07/10/2024                   Associated Diagnoses      M54.2 Cervicalgia      Proc  edure -> Epidural Injection (specify level) Cmt: cervical interlaminar        Physician -> Armandou         Facility Name: -> [facility]

## 2024-07-12 DIAGNOSIS — M54.2 CERVICALGIA: Primary | ICD-10-CM

## 2024-07-12 NOTE — H&P (VIEW-ONLY)
DATE: 7/12/2024  PATIENT: Ramakrishna Vargas    Attending Physician: Yasmani Wood M.D.    CHIEF COMPLAINT:  Right upper extremity radiculopathy    HISTORY:  Ramakrishna Vargas is a 44 y.o. male right-hand dominant  who presents for a 2 to three-week history of right periscapular pain and right upper extremity radiating pain.  The patient reports that he felt a pop in his neck when morning and awoke with severe pain.  He has had difficulty working secondary to this pain.  He has never had anything like this before.  Thus far he has tried an oral steroid pack as well as Naprosyn Robaxin and gabapentin.  The patient reports relief with lying down, his pain is worse with extending his neck, and he holds his neck in flexion.  The patient does have a strong family history of spine surgery.       The Patient denies myelopathic symptoms such as handwriting changes or difficulty with buttons/coins/keys. Denies perineal paresthesias, bowel/bladder dysfunction.    PAST MEDICAL/SURGICAL HISTORY:  Past Medical History:   Diagnosis Date    Seasonal allergies 2015     History reviewed. No pertinent surgical history.    Current Medications:   Current Outpatient Medications:     buPROPion (WELLBUTRIN) 100 MG tablet, , Disp: , Rfl:     EScitalopram oxalate (LEXAPRO) 10 MG tablet, , Disp: , Rfl:     gabapentin (NEURONTIN) 300 MG capsule, Take 600 mg by mouth 3 (three) times daily., Disp: , Rfl:     ketoconazole (NIZORAL) 2 % cream, Apply to affected areas of face BID prn scaling., Disp: 60 g, Rfl: 5    meloxicam (MOBIC) 15 MG tablet, Take 1 tablet (15 mg total) by mouth once daily., Disp: 28 tablet, Rfl: 0    methocarbamoL (ROBAXIN) 500 MG Tab, Take 2 tablets (1,000 mg total) by mouth 3 (three) times daily., Disp: 180 tablet, Rfl: 0    methylPREDNISolone (MEDROL DOSEPACK) 4 mg tablet, use as directed, Disp: 21 each, Rfl: 0    moxifloxacin (VIGAMOX) 0.5 % ophthalmic solution, , Disp: , Rfl:     naproxen (NAPROSYN) 500  "MG tablet, Take 1 tablet (500 mg total) by mouth 2 (two) times daily with meals., Disp: 60 tablet, Rfl: 1    Social History:   Social History     Socioeconomic History    Marital status:    Tobacco Use    Smoking status: Never    Smokeless tobacco: Never   Substance and Sexual Activity    Alcohol use: Yes     Comment: + moderate use of alcohol.  Lives at home    Drug use: Never    Sexual activity: Not Currently     Partners: Female     Birth control/protection: Implant     Social Determinants of Health     Financial Resource Strain: Low Risk  (7/3/2024)    Overall Financial Resource Strain (CARDIA)     Difficulty of Paying Living Expenses: Not hard at all   Food Insecurity: No Food Insecurity (7/3/2024)    Hunger Vital Sign     Worried About Running Out of Food in the Last Year: Never true     Ran Out of Food in the Last Year: Never true   Physical Activity: Sufficiently Active (7/3/2024)    Exercise Vital Sign     Days of Exercise per Week: 6 days     Minutes of Exercise per Session: 120 min   Stress: No Stress Concern Present (7/3/2024)    [place] [employer] - Occupational Stress Questionnaire     Feeling of Stress : Only a little   Housing Stability: Unknown (7/3/2024)    Housing Stability Vital Sign     Unable to Pay for Housing in the Last Year: No        EXAM:  Ht 6' 4" (1.93 m)   Wt 98.6 kg (217 lb 7.7 oz)   BMI 26.47 kg/m²     Gait: Normal station and gait, no difficulty with toe or heel walk.   Skin: Cervical skin negative for rashes, lesions, hairy patches and surgical scars.  Range of motion:  The patient holds his cervical spine in flexion.  There is mild diffuse posterior cervical tenderness to palpation.  Spinal Balance: Global saggital and coronal spinal balance acceptable, no significant for scoliosis and kyphosis.  Musculoskeletal: No pain with the range of motion of the bilateral shoulders and elbows. Normal bulk and contour of the bilateral hands.  Neurological: " "Normal strength and tone in all major motor groups in the bilateral upper and lower extremities. Normal sensation to light touch in the C5-T1 and L2-S1 dermatomes bilaterally.  Deep tendon reflexes symmetric 1+ in the bilateral upper and lower extremities.  Negative Inverted Radial Reflex and Ruiz's bilaterally. Negative Babinski bilaterally.     IMAGING:   Today I personally reviewed AP, Lat and Flex/Ex  upright C-spine films that demonstrate global cervical kyphosis consistent with his flexed posture.  There is mild multilevel cervical spondylosis.    I also reviewed a recent MRI of the cervical spine that demonstrates a large right-sided C6-7 foraminal disc herniation.      Body mass index is 26.47 kg/m².  No results found for: "HGBA1C"    ASSESSMENT/PLAN:  Cervicalgia  -     Ambulatory referral/consult to [facility]  -     Procedure Order to Pain Management; Future; Expected date: 07/10/2024      No follow-ups on file.    Today we discussed options, the patient has acute right upper extremity radiculopathy secondary to right-sided C6-7 disc herniation.  I have advocated him to try a cervical epidural steroid injection as well as physical therapy.      "

## 2024-07-12 NOTE — PROGRESS NOTES
DATE: 7/12/2024  PATIENT: Ramakrishna Vargas    Attending Physician: Yasmani Wood M.D.    CHIEF COMPLAINT:  Right upper extremity radiculopathy    HISTORY:  Ramakrishna Vargas is a 44 y.o. male right-hand dominant  who presents for a 2 to three-week history of right periscapular pain and right upper extremity radiating pain.  The patient reports that he felt a pop in his neck when morning and awoke with severe pain.  He has had difficulty working secondary to this pain.  He has never had anything like this before.  Thus far he has tried an oral steroid pack as well as Naprosyn Robaxin and gabapentin.  The patient reports relief with lying down, his pain is worse with extending his neck, and he holds his neck in flexion.  The patient does have a strong family history of spine surgery.       The Patient denies myelopathic symptoms such as handwriting changes or difficulty with buttons/coins/keys. Denies perineal paresthesias, bowel/bladder dysfunction.    PAST MEDICAL/SURGICAL HISTORY:  Past Medical History:   Diagnosis Date    Seasonal allergies 2015     History reviewed. No pertinent surgical history.    Current Medications:   Current Outpatient Medications:     buPROPion (WELLBUTRIN) 100 MG tablet, , Disp: , Rfl:     EScitalopram oxalate (LEXAPRO) 10 MG tablet, , Disp: , Rfl:     gabapentin (NEURONTIN) 300 MG capsule, Take 600 mg by mouth 3 (three) times daily., Disp: , Rfl:     ketoconazole (NIZORAL) 2 % cream, Apply to affected areas of face BID prn scaling., Disp: 60 g, Rfl: 5    meloxicam (MOBIC) 15 MG tablet, Take 1 tablet (15 mg total) by mouth once daily., Disp: 28 tablet, Rfl: 0    methocarbamoL (ROBAXIN) 500 MG Tab, Take 2 tablets (1,000 mg total) by mouth 3 (three) times daily., Disp: 180 tablet, Rfl: 0    methylPREDNISolone (MEDROL DOSEPACK) 4 mg tablet, use as directed, Disp: 21 each, Rfl: 0    moxifloxacin (VIGAMOX) 0.5 % ophthalmic solution, , Disp: , Rfl:     naproxen (NAPROSYN) 500  "MG tablet, Take 1 tablet (500 mg total) by mouth 2 (two) times daily with meals., Disp: 60 tablet, Rfl: 1    Social History:   Social History     Socioeconomic History    Marital status:    Tobacco Use    Smoking status: Never    Smokeless tobacco: Never   Substance and Sexual Activity    Alcohol use: Yes     Comment: + moderate use of alcohol.  Lives at home    Drug use: Never    Sexual activity: Not Currently     Partners: Female     Birth control/protection: Implant     Social Determinants of Health     Financial Resource Strain: Low Risk  (7/3/2024)    Overall Financial Resource Strain (CARDIA)     Difficulty of Paying Living Expenses: Not hard at all   Food Insecurity: No Food Insecurity (7/3/2024)    Hunger Vital Sign     Worried About Running Out of Food in the Last Year: Never true     Ran Out of Food in the Last Year: Never true   Physical Activity: Sufficiently Active (7/3/2024)    Exercise Vital Sign     Days of Exercise per Week: 6 days     Minutes of Exercise per Session: 120 min   Stress: No Stress Concern Present (7/3/2024)    [employer] [place] [employer] - Occupational Stress Questionnaire     Feeling of Stress : Only a little   Housing Stability: Unknown (7/3/2024)    Housing Stability Vital Sign     Unable to Pay for Housing in the Last Year: No        EXAM:  Ht 6' 4" (1.93 m)   Wt 98.6 kg (217 lb 7.7 oz)   BMI 26.47 kg/m²     Gait: Normal station and gait, no difficulty with toe or heel walk.   Skin: Cervical skin negative for rashes, lesions, hairy patches and surgical scars.  Range of motion:  The patient holds his cervical spine in flexion.  There is mild diffuse posterior cervical tenderness to palpation.  Spinal Balance: Global saggital and coronal spinal balance acceptable, no significant for scoliosis and kyphosis.  Musculoskeletal: No pain with the range of motion of the bilateral shoulders and elbows. Normal bulk and contour of the bilateral hands.  Neurological: " "Normal strength and tone in all major motor groups in the bilateral upper and lower extremities. Normal sensation to light touch in the C5-T1 and L2-S1 dermatomes bilaterally.  Deep tendon reflexes symmetric 1+ in the bilateral upper and lower extremities.  Negative Inverted Radial Reflex and Ruiz's bilaterally. Negative Babinski bilaterally.     IMAGING:   Today I personally reviewed AP, Lat and Flex/Ex  upright C-spine films that demonstrate global cervical kyphosis consistent with his flexed posture.  There is mild multilevel cervical spondylosis.    I also reviewed a recent MRI of the cervical spine that demonstrates a large right-sided C6-7 foraminal disc herniation.      Body mass index is 26.47 kg/m².  No results found for: "HGBA1C"    ASSESSMENT/PLAN:  Cervicalgia  -     Ambulatory referral/consult to [facility]  -     Procedure Order to Pain Management; Future; Expected date: 07/10/2024      No follow-ups on file.    Today we discussed options, the patient has acute right upper extremity radiculopathy secondary to right-sided C6-7 disc herniation.  I have advocated him to try a cervical epidural steroid injection as well as physical therapy.      "

## 2024-07-18 ENCOUNTER — CLINICAL SUPPORT (OUTPATIENT)
Dept: REHABILITATION | Facility: OTHER | Age: 44
End: 2024-07-18
Payer: COMMERCIAL

## 2024-07-18 DIAGNOSIS — R29.898 DECREASED ROM OF NECK: Primary | ICD-10-CM

## 2024-07-18 DIAGNOSIS — M54.2 CERVICALGIA: ICD-10-CM

## 2024-07-18 PROCEDURE — 97110 THERAPEUTIC EXERCISES: CPT

## 2024-07-18 PROCEDURE — 97161 PT EVAL LOW COMPLEX 20 MIN: CPT

## 2024-07-18 PROCEDURE — 97530 THERAPEUTIC ACTIVITIES: CPT

## 2024-07-18 PROCEDURE — 97140 MANUAL THERAPY 1/> REGIONS: CPT

## 2024-07-18 NOTE — PROGRESS NOTES
[facility]  Physical Therapy Initial Evaluation    Name: Ramakrishna Vargas  [facility] Number: [id number]    Therapy Diagnosis:   Encounter Diagnoses   Name Primary?    Cervicalgia     Decreased ROM of neck Yes     Physician: Sherita Mccray P*    Physician Orders: PT Eval and Treat   Medical Diagnosis from Referral: M54.2 (ICD-10-CM) - Cervicalgia   Evaluation Date: 7/18/2024  Authorization Period Expiration: ***  Plan of Care Expiration: ***  Visit # / Visits authorized: 1/ 1    Time In: 8:08 am  Time Out: ***  Total Billable Time: *** minutes    Precautions: Standard    Subjective   Date of onset: 4 weeks   History of current condition - Ramakrishna reports: that he had an MRI and they noticed a disc bulge in his cervical spine. Patient reports that looking up really increases his R arm symptoms. Sometimes the pain gets to his R pinky, but mainly stops at his R elbow. Patient reports no history of neck issues, but he has had lower back symptoms before. Patient reports they have an injection scheduled in early August. Patient reports that he is currently taking naproxen in the AM and gabapentin in the PM. Patient reports very minimal relief with medication.    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    Medications:   Ramakrishna has a current medication list which includes the following prescription(s): bupropion, lexapro, gabapentin, ketoconazole, meloxicam, methocarbamol, methylprednisolone, moxifloxacin, and naproxen.    Allergies:   Review of patient's allergies indicates:  No Known Allergies     Objective     Posture Alignment: {AMB PT VESTIBULAR POSTURE ALIGNMENT: 16037};{AMB PT VESTIBULAR POSTURE ALIGNMENT: 23815};{AMB PT VESTIBULAR POSTURE ALIGNMENT: 01527}    Sensation: Light touch: {INTACT:42758}    DTR:***    Cervical Range of Motion:    Degrees Pain   Flexion *** ***     Extension *** ***     Right Side Bending *** ***     Left Side Bending *** ***     Right Rotation *** ***   Left Rotation *** ***      Shoulder Range of Motion:   Shoulder Left Right   Flexion *** ***   Abduction *** ***   ER *** ***   IR *** ***     Strength:  Cervical MMT   Flexion ***   Extension ***   Right Side Bend ***   Left Side Bend ***     Upper Extremity Strength  (R) UE  (L) UE    Shoulder elevation: {AMB PT VESTIBULAR STRENGTH:90196} Shoulder elevation: {AMB PT VESTIBULAR STRENGTH:92358}   Shoulder flexion: {AMB PT VESTIBULAR STRENGTH:29057} Shoulder flexion: {AMB PT VESTIBULAR STRENGTH:48824}   Shoulder Abduction: {AMB PT VESTIBULAR STRENGTH:71810} Shoulder abduction: {AMB PT VESTIBULAR STRENGTH:17010}   Shoulder ER {AMB PT VESTIBULAR STRENGTH:82501} Shoulder ER {AMB PT VESTIBULAR STRENGTH:16608}   Shoulder IR {AMB PT VESTIBULAR STRENGTH:64885} Shoulder IR {AMB PT VESTIBULAR STRENGTH:56247}   Elbow flexion: {AMB PT VESTIBULAR STRENGTH:14076} Elbow flexion: {AMB PT VESTIBULAR STRENGTH:38894}   Elbow extension: {AMB PT VESTIBULAR STRENGTH:61713} Elbow extension: {AMB PT VESTIBULAR STRENGTH:35268}   Wrist flexion: {AMB PT  "VESTIBULAR STRENGTH:07676} Wrist flexion: {AMB PT VESTIBULAR STRENGTH:78832}   Wrist extension: {AMB PT VESTIBULAR STRENGTH:61581} Wrist extension: {AMB PT VESTIBULAR STRENGTH:75815}    {AMB PT VESTIBULAR STRENGTH:05444} : {AMB PT VESTIBULAR STRENGTH:11530}   Lower Trap {AMB PT VESTIBULAR STRENGTH:48254} Lower Trap {AMB PT VESTIBULAR STRENGTH:93052}   Middle Trap {AMB PT VESTIBULAR STRENGTH:67401} Middle Trap {AMB PT VESTIBULAR STRENGTH:68209}   Rhomboids {AMB PT VESTIBULAR STRENGTH:46020} Rhomboids {AMB PT VESTIBULAR STRENGTH:01443}       Special Tests:  Distraction ***   Compression ***   Spurlings ***   Sharp-Trini ***   VA test ***   Lateral Flexion Alar Ligament ***   DNF test ***     Upper Limb Neurodynamic testing:   Right Left   UNT *** ***   MNT *** ***   RNT *** ***       Joint Mobility: ***,    PA's ***,    Transverse glides ***,      Thoracic mobility: ***    Palpation: ***      Flexibility: ***    PT Evaluation Completed? {YES:11254}  Discussed Plan of Care with patient: {YES:84018}    CMS Impairment/Limitation/Restriction for FOTO *** Survey    Therapist reviewed FOTO scores for Ramakrishna Vargas on 7/18/2024.   FOTO documents entered into EPIC - see Media section.    Limitation Score: ***%  Category: {Blank single:84212::"Other","Self Care","Body Position","Carrying","Mobility"}    Current : {G Codes:20844}  Goal: {G Codes:07245}  Discharge: {G Codes:96711}         TREATMENT   Treatment Time In: ***  Treatment Time Out: ***  Total Treatment time separate from Evaluation: *** minutes    Ramakrishna received therapeutic exercises to develop {AMB PT PROGRESS OBJECTIVE:72658} for *** minutes including:  ***    Ramakrishna received the following manual therapy techniques: {AMB PT PROGRESS MANUAL THERAPY:04169} were applied to the: *** for *** minutes, including:  ***    Ramakrishna participated in neuromuscular re-education activities to improve: {AMB PT PROGRESS NEURO RE-ED:10960} for *** minutes. The following " "activities were included:  ***    Ramakrishna participated in dynamic functional therapeutic activities to improve functional performance for ***  minutes, including:  ***    Ramakrishna participated in gait training to improve functional mobility and safety for ***  minutes, including:  ***    Ramakrishna received *** hot or cold pack for *** minutes to ***.      Home Exercises and Patient Education Provided    Education provided:   - ***    Written Home Exercises Provided: {Blank single:53084::"yes","Patient instructed to cont prior HEP"}.  Exercises were reviewed and Ramakrishna was able to demonstrate them prior to the end of the session.  Ramakrishna demonstrated {Desc; good/fair/poor:13249} understanding of the education provided.     See EMR under {Blank single:90732::"Media","Patient Instructions"} for exercises provided {Blank single:08671::"7/18/2024","prior visit"}.    Assessment   Ramakrishna is a 44 y.o. male referred to outpatient Physical Therapy with a medical diagnosis of M54.2 (ICD-10-CM) - Cervicalgia . Pt presents with ***      Pt prognosis is {REHAB PROGNOSIS OHS:44606}.   Pt will benefit from skilled outpatient Physical Therapy to address the deficits stated above and in the chart below, provide pt/family education, and to maximize pt's level of independence.     Plan of care discussed with patient: {YES:25425}  Pt's spiritual, cultural and educational needs considered and patient is agreeable to the plan of care and goals as stated below:     Anticipated Barriers for therapy: ***    Medical Necessity is demonstrated by the following  History  Co-morbidities and personal factors that may impact the plan of care Co-morbidities:   {Co-morbidities:91651}    Personal Factors:   {Personal Factors:56475}     low   Examination  Body Structures and Functions, activity limitations and participation restrictions that may impact the plan of care Body Regions:   {Body Regions:20243}    Body Systems:    {Body Systems:67381}    Participation " Restrictions:   ***    Activity limitations:   Learning and applying knowledge  {Learning and applying knowledge:26507}    General Tasks and Commands  {Gen tasks and commands:06559}    Communication  {Communication:97977}    Mobility  {Mobility:62305}    Self care  {Self Care:56139}    Domestic Life  {Domestic Life:87143}    Interactions/Relationships  {Interactions/Relationships:70487}    Life Areas  {Life Areas:53958}    Community and Social Life  {Community/Social Life:30594}         Complexity: low   Clinical Presentation {Clinical Presentation :40020} low   Decision Making/ Complexity Score: low       GOALS: Short Term Goals: 4 weeks  1.Report decreased in pain at worse less than  <   / =  ***  /10  to increase tolerance for functional activities. On going  2. Pt to improve range of motion **** by 25% to allow for improved functional mobility to allow for improvement in IADLs.  On going  3. Increased  **** MMT 1/2  grade to increase tolerance for ADL and work activities. On going  4. Pt to report a ***  5. Pt to tolerate HEP to improve ROM and independence with ADL's. On going  6. Increased MMT  for lower traps/middle traps/rhomboids to > or = ***/5 to increase tolerance for ADL and improve posture. On going    Long Term Goals: 8 weeks  1.Report decreased in pain at worse less than  <   / =  ***  /10  to increase tolerance for functional activities   2.Pt to improve range of motion by 75% to allow for improved functional mobility to allow for improvement in IADLs.   3.Increased *** MMT  1 grade  to increase tolerance for ADL and work activities.  4.  Pt will report *** on FOTO neck survey score for neck pain disability to demonstrate decrease in disability and improvement in neck pain.  5. Pt to be Independent with HEP to improve ROM and independence with ADL's  6. Increased MMT  for lower traps/middle traps/rhomboids to > or = ***/5 to increase tolerance for ADL and improve posture.      Plan   Plan of care  "Certification: 7/18/2024 to ***.    Outpatient Physical Therapy {NUMBERS 1-5:83615} times weekly for {0-10:75375::"0"} weeks to include the following interventions: {TX PLAN:18334}, margret Brewster PT      "

## 2024-07-18 NOTE — PLAN OF CARE
[facility]  Physical Therapy Initial Evaluation    Name: Ramakrishna Vargas  [facility] Number: [id number]    Therapy Diagnosis:   Encounter Diagnoses   Name Primary?    Cervicalgia     Decreased ROM of neck Yes     Physician: Sherita Mccray P*    Physician Orders: PT Eval and Treat   Medical Diagnosis from Referral: M54.2 (ICD-10-CM) - Cervicalgia   Evaluation Date: 7/18/2024  Authorization Period Expiration: 7/12/25  Plan of Care Expiration: 9/26/24  Visit # / Visits authorized: 1/ 1 Reassessment due: 8/18/24  FOTO 1/3    Time In: 8:08 am  Time Out: 9:00 am  Total Billable Time: 52 minutes    Precautions: Standard (peripheralizes quickly and severley with EX)    Subjective   Date of onset: 4-6 weeks   History of current condition - Ramakrishna reports: that he had an MRI and they noticed a disc bulge in his cervical spine. Patient reports that looking up really increases his R arm symptoms and can take awhile for them to calm down. Sometimes the pain gets to his R pinky, but mainly stops at his R lateral elbow. Patient reports no history of neck issues, but he has had lower back symptoms before. Patient reports they have an injection scheduled in early August, but is currently taking naproxen in the AM and gabapentin in the PM. Patient reports very minimal relief with medication.    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    Medications:   Ramakrishna has a current medication list which includes the following prescription(s): bupropion, lexapro, gabapentin, ketoconazole, meloxicam, methocarbamol, methylprednisolone, moxifloxacin, and naproxen.    Allergies:   Review of patient's allergies indicates:  No Known Allergies     Objective     Posture Alignment: forward head;increased kyphosis;slouched posture    Sensation: Light touch: Impaired: along lateral elbow    Cervical Range of Motion:    Degrees Pain   Flexion WNL Improvement in symptoms     Extension Not able to get past neutral due to increase in radicular symptoms Y     Right Side Bending WNL In a flexed position     Left Side Bending WNL In a flexed position     Right Rotation WNL In a flexed position   Left Rotation WNL In a flexed position      Shoulder Range of Motion:   Shoulder Left Right   Flexion WNL WNL   Abduction WNL WNL   ER WNL WNL   IR WNL WNL     Strength:  Cervical MMT   Flexion 4/5   Extension 4/5   Right Side Bend 4/5   Left Side Bend 4/5     Upper Extremity Strength  (R) UE  (L) UE    Shoulder elevation: 5/5 Shoulder elevation: 4+/5   Shoulder flexion: 4/5 Shoulder flexion: 5/5   Shoulder Abduction: 4/5 Shoulder abduction: 5/5   Shoulder ER 4/5 Shoulder ER 5/5   Shoulder IR 4/5 Shoulder IR 5/5   Elbow flexion: 4+/5 Elbow flexion: 5/5   Elbow extension: 4/5 Elbow extension: 5/5   Wrist flexion: 4+/5 Wrist flexion: 5/5   Wrist extension: 4+/5 Wrist extension: 5/5    DNT : DNT   Lower Trap 3/5 Lower Trap 4/5   Middle Trap 3+/5 Middle Trap 4+/5   Rhomboids 4-/5 Rhomboids 5/5     Special Tests:  Distraction Neg    Compression Pos   Spurlings Pos   Sharp-Tirni Neg   VA test Neg   Lateral Flexion Alar Ligament Neg   DNF test 10 seconds     Upper Limb Neurodynamic testing:   Right Left   UNT Pos Neg   MNT Pos Neg   RNT Pos Neg     Joint Mobility: hypomobile into cervical EX    Thoracic mobility: hypomobile    Palpation: TTP to R UT and to cervical paraspinals      PT Evaluation Completed? Yes  Discussed Plan of Care with patient: Yes    CMS Impairment/Limitation/Restriction for FOTO Neck Survey    Therapist reviewed FOTO scores for Ramakrishna Vargas on 7/18/2024.   FOTO documents entered into EPIC - see Media section.    Limitation Score: 49    Goal Score: 66     See EMR under MEDIA for written consent provided.    Palpation Assessment to determine the necessity for Functional Dry Needling  YES.     TREATMENT   Treatment Time In: 8:30 am  Treatment Time Out: 9:00 am  Total Treatment time separate from Evaluation: 30 minutes    Next visit: FDN, cervical FL, SNAGs as able, cervical retractions, supine/standing pull aparts, cervical isometrics (peripheralizes quickly with extension)    Ramakrishna received therapeutic exercises to develop ROM and flexibility for 8 minutes including:  HEP Review: Supine chin tucks, cervical flexion, and shoulder shrugs    Ramakrishna received the following manual therapy techniques: Joint mobilizations and Soft tissue Mobilization were applied to the: Cervical spine for 14 minutes, including:  Cervical sideglides grade 2/3 C5-C7    FDN fanning to R UT and x 2 points to C4-C7 cervical multifidi with 4 hz estim x 8 minutes with good tolerance and minimal increase in adverse symptoms (had to have patient in cervical flexion on high/low table).    Ramakrishna participated in dynamic functional therapeutic activities to improve functional performance for 8  minutes, including:  Rehab progression/potential    Home Exercises and Patient Education Provided    Education provided:   - See above    Written Home Exercises  Provided: yes.  Exercises were reviewed and Ramakrishna was able to demonstrate them prior to the end of the session.  Ramakrishna demonstrated good  understanding of the education provided.     See EMR under Patient Instructions for exercises provided 7/18/2024.    Assessment   Ramakrishna is a 44 y.o. male referred to outpatient Physical Therapy with a medical diagnosis of M54.2 (ICD-10-CM) - Cervicalgia . Pt presents with significant cervical ROM restrictions into EX, increased pain/adverse symptoms, decreased strength, decreased joint mobility, and decreased tolerance to activity. Patient's symptoms are consistent with cervical radiculopathy that centralizes with flexion and even going past neutral spine into extension patient getting R radicular symptoms.     Patient demonstrated appropriate response to Functional Dry Needling. good  rhythmical contractions observed with estim to treated muscle groups.    Pt prognosis is Good.   Pt will benefit from skilled outpatient Physical Therapy to address the deficits stated above and in the chart below, provide pt/family education, and to maximize pt's level of independence.     Plan of care discussed with patient: Yes  Pt's spiritual, cultural and educational needs considered and patient is agreeable to the plan of care and goals as stated below:     Anticipated Barriers for therapy: severity of symptoms    Medical Necessity is demonstrated by the following  History  Co-morbidities and personal factors that may impact the plan of care Co-morbidities:   none    Personal Factors:   no deficits     low   Examination  Body Structures and Functions, activity limitations and participation restrictions that may impact the plan of care Body Regions:   neck  upper extremities    Body Systems:    ROM  strength  transitions  motor control  motor learning    Participation Restrictions:   Work, ADLs, iADLs, and wanted activities    Activity limitations:   Learning and applying knowledge  no  deficits    General Tasks and Commands  no deficits    Communication  no deficits    Mobility  lifting and carrying objects  fine hand use (grasping/picking up)  walking    Self care  no deficits    Domestic Life  shopping  cooking  doing house work (cleaning house, washing dishes, laundry)  assisting others    Interactions/Relationships  no deficits    Life Areas  no deficits    Community and Social Life  community life  recreation and leisure         Complexity: low   Clinical Presentation stable and uncomplicated low   Decision Making/ Complexity Score: low       GOALS: Short Term Goals: 4-5 weeks  1.Report decreased in pain at worse less than  <   / =  6  /10  to increase tolerance for functional activities. On going  2. Pt to improve cervical range of motion by 75% to allow for improved functional mobility to allow for improvement in IADLs.  On going  3. Increased  Cervical/UE MMT 1/2  grade to increase tolerance for ADL and work activities. On going  4. Pt to tolerate HEP to improve ROM and independence with ADL's. On going  5. Increased MMT  for lower traps/middle traps/rhomboids to > or = 4/5 to increase tolerance for ADL and improve posture. On going    Long Term Goals: 8-10 weeks  1.Report decreased in pain at worse less than  <   / =  3  /10  to increase tolerance for functional activities   2.Pt to improve range of motion by 90% to allow for improved functional mobility to allow for improvement in IADLs.   3.Increased B UE/neck MMT  1 grade  to increase tolerance for ADL and work activities.  4.  Pt will report 66 or greater on FOTO neck survey score for neck pain disability to demonstrate decrease in disability and improvement in neck pain.  5. Pt to be Independent with HEP to improve ROM and independence with ADL's  6. Increased MMT  for lower traps/middle traps/rhomboids to > or = 4+/5 to increase tolerance for ADL and improve posture.      Plan   Plan of care Certification: 7/18/2024 to  9/26/24.    Outpatient Physical Therapy 1-2 times weekly for 10 weeks to include the following interventions: Cervical/Lumbar Traction, Electrical Stimulation TENS/IFC/NMES, Manual Therapy, Moist Heat/ Ice, Neuromuscular Re-ed, Self Care, Therapeutic Activities, and Therapeutic Exercise, and dry needling (Monitor response to Functional Dry Needling-  continue with Functional Dry Needling in POC as appropriate).      William Brewster, PT

## 2024-07-23 ENCOUNTER — CLINICAL SUPPORT (OUTPATIENT)
Dept: REHABILITATION | Facility: OTHER | Age: 44
End: 2024-07-23
Payer: COMMERCIAL

## 2024-07-23 DIAGNOSIS — R29.898 DECREASED ROM OF NECK: Primary | ICD-10-CM

## 2024-07-23 PROCEDURE — 97140 MANUAL THERAPY 1/> REGIONS: CPT

## 2024-07-23 PROCEDURE — 97110 THERAPEUTIC EXERCISES: CPT | Mod: CQ

## 2024-07-23 NOTE — PROGRESS NOTES
"  Physical Therapy Daily Treatment Note     Name: Ramakrishna Vargas  Clinic Number: [id number]    Therapy Diagnosis:   Encounter Diagnosis   Name Primary?    Decreased ROM of neck Yes     Physician: Sherita Mccray P*    Visit Date: 7/23/2024    Physician Orders: PT Eval and Treat   Medical Diagnosis from Referral: M54.2 (ICD-10-CM) - Cervicalgia   Evaluation Date: 7/18/2024  Authorization Period Expiration: 7/12/25  Plan of Care Expiration: 9/26/24  Visit # / Visits authorized: 1/ 1 Reassessment due: 8/18/24  FOTO 1/3    Time In: 302 PM  Time Out: 347 PM   Total Billable Time: 45 minutes    Precautions: Standard (peripheralizes quickly and severley with EX)    Subjective     Pt reports: He feels better than last time. He feels like the FDN helped right away last time. He thinks that the home exercises are making things worse though he thinks that he might be pushing himself too hard. He has been unable to sleep due to the pain.   He was compliant with home exercise program.  Response to previous treatment: Eval  Functional change: Ongoing     Pain: 4/10  Location: R shoulder, Tricep     Objective     Ramakrishna received therapeutic exercises to develop ROM and flexibility for 8 minutes including:    - UBE 3/2'    - Supine chin tucks, -unable to perform   -Shoulder shrugs- inc symptoms  - cervical flexion 2 x 10 x 5"  - Shoulder rolls ant/post x 20 ea   - Wand flexion 3# x 20  -Supine horizontal abd BTB 2 x 10 x 5"  -Open books x 15 ea (inc symptoms)  -Standing open books x 10 ea   -D2 flexion RTB 2 x 10 (R)  -Seated bilateral ER B TB 2 x 10 x 5"  -MedX rows 80# 2 x 10       Ramakrishna received the following manual therapy techniques: Joint mobilizations, Manual traction, Myofacial release, and Soft tissue Mobilization were applied to the:  for 00 minutes, including:      Ramakrishna participated in neuromuscular re-education activities to improve: Coordination and Posture for 00 minutes. The following activities were " included:          Home Exercises Provided and Patient Education Provided     Education provided:   - Discontinue HEP that exacerbates symptoms    Written Home Exercises Provided: Patient instructed to cont prior HEP.  Exercises were reviewed and Ramakrishna was able to demonstrate them prior to the end of the session.  Ramakrishna demonstrated good  understanding of the education provided.     See EMR under Patient Instructions for exercises provided 7/18/2024.  Assessment   Ramakrishna presents to session with mod levels of pain. Pt reports overall improvement in symptoms. Due to pts subjective reports of inc symptoms with shoulder shrugs and chin tuck, pt was encouraged to DC for now. Introduced thoracic mobility and periscapular and GHJ strengthening which pt tolerated fairly. Will progress as tolerated.     Ramakrishna Is progressing well towards his goals.   Pt prognosis is Good.     Pt will continue to benefit from skilled outpatient physical therapy to address the deficits listed in the problem list box on initial evaluation, provide pt/family education and to maximize pt's level of independence in the home and community environment.     Pt's spiritual, cultural and educational needs considered and pt agreeable to plan of care and goals.     Anticipated Barriers for therapy: severity of symptoms    GOALS: Short Term Goals: 4-5 weeks  1.Report decreased in pain at worse less than  <   / =  6  /10  to increase tolerance for functional activities. On going  2. Pt to improve cervical range of motion by 75% to allow for improved functional mobility to allow for improvement in IADLs.  On going  3. Increased  Cervical/UE MMT 1/2  grade to increase tolerance for ADL and work activities. On going  4. Pt to tolerate HEP to improve ROM and independence with ADL's. On going  5. Increased MMT  for lower traps/middle traps/rhomboids to > or = 4/5 to increase tolerance for ADL and improve posture. On going     Long Term Goals: 8-10  weeks  1.Report decreased in pain at worse less than  <   / =  3  /10  to increase tolerance for functional activities   2.Pt to improve range of motion by 90% to allow for improved functional mobility to allow for improvement in IADLs.   3.Increased B UE/neck MMT  1 grade  to increase tolerance for ADL and work activities.  4.  Pt will report 66 or greater on FOTO neck survey score for neck pain disability to demonstrate decrease in disability and improvement in neck pain.  5. Pt to be Independent with HEP to improve ROM and independence with ADL's  6. Increased MMT  for lower traps/middle traps/rhomboids to > or = 4+/5 to increase tolerance for ADL and improve posture.    Plan     Plan of care Certification: 7/18/2024 to 9/26/24.    Rogelio Dietrich, KARMA   07/23/2024

## 2024-07-23 NOTE — PROGRESS NOTES
Performed trigger point dry needling with patient approval. Patient positioned in prone with neck slightly flexed and BLE propped. Inserted 0.23.x0.30 mm needle into (R) UT using pincher grasp for two times for 1 minute each. No adverse symptoms noted. No small droplet of blood detected. Needling followed by gentle STM and cryotherapy afterwards. Patient responded well to trigger point dry needling with relief of symptoms after.

## 2024-07-24 NOTE — PROGRESS NOTES
"  Physical Therapy Daily Treatment Note     Name: Ramakrishna Vargas  Clinic Number: [id number]    Therapy Diagnosis:   Encounter Diagnosis   Name Primary?    Decreased ROM of neck Yes       Physician: Sherita Mccray P*    Visit Date: 7/25/2024    Physician Orders: PT Eval and Treat   Medical Diagnosis from Referral: M54.2 (ICD-10-CM) - Cervicalgia   Evaluation Date: 7/18/2024  Authorization Period Expiration: 7/12/25  Plan of Care Expiration: 9/26/24  Visit # / Visits authorized: 3/ 20 Reassessment due: 8/18/24  FOTO 1/3    Time In: 245 PM   Time Out: 325 PM  Total Billable Time: 40 minutes    Precautions: Standard (peripheralizes quickly and severley with EX)    Subjective     Pt reports: He thinks it's getting better.   He was compliant with home exercise program.  Response to previous treatment: Felt better  Functional change: Ongoing     Pain: 3/10  Location: R shoulder, Tricep     Objective     Ramakrishna received therapeutic exercises to develop ROM and flexibility for 37 minutes including:    - UBE 2/2'    - Supine chin tucks, towel under neck   -Shoulder shrugs- inc symptoms  - cervical flexion 2 x 10 x 5"  - Shoulder rolls ant/post x 20 ea   - Wand flexion 3# x 20  -Supine horizontal abd BTB 2 x 10 x 5"  -Standing open books x 10 ea   -D2 flexion BTB 2 x 10 ea  -Seated bilateral ER B TB 2 x 10 x 5"  -MedX rows 60# 2 x 10   -Wall slides with towel x 20   Seated thoracic extension with bolster 15 x 5"    Ramakrishna received the following manual therapy techniques: Joint mobilizations, Manual traction, Myofacial release, and Soft tissue Mobilization were applied to the:  for 00 minutes, including:      Ramakrishna participated in neuromuscular re-education activities to improve: Coordination and Posture for 03 minutes. The following activities were included:    MedX wellness cervical ext (seat 120; ROM ) 90 lbs x 15 0/10 (started to inc symptoms at end)      Home Exercises Provided and Patient Education Provided "     Education provided:   - Discontinue HEP that exacerbates symptoms    Written Home Exercises Provided: Patient instructed to cont prior HEP.  Exercises were reviewed and Ramakrishna was able to demonstrate them prior to the end of the session.  Ramakrishna demonstrated good  understanding of the education provided.     See EMR under Patient Instructions for exercises provided 7/18/2024.  Assessment   Ramakrishna presents to session with some improvement in symptoms. Pt continues to not tolerate cervical retraction due to peripheralization. Introduced cervical medX with pt able tolerate at 0/10 RPE though some symptoms reproduced. Continue progressing pt to tolerance to address deficits.      Ramakrishna Is progressing well towards his goals.   Pt prognosis is Good.     Pt will continue to benefit from skilled outpatient physical therapy to address the deficits listed in the problem list box on initial evaluation, provide pt/family education and to maximize pt's level of independence in the home and community environment.     Pt's spiritual, cultural and educational needs considered and pt agreeable to plan of care and goals.     Anticipated Barriers for therapy: severity of symptoms    GOALS: Short Term Goals: 4-5 weeks  1.Report decreased in pain at worse less than  <   / =  6  /10  to increase tolerance for functional activities. On going  2. Pt to improve cervical range of motion by 75% to allow for improved functional mobility to allow for improvement in IADLs.  On going  3. Increased  Cervical/UE MMT 1/2  grade to increase tolerance for ADL and work activities. On going  4. Pt to tolerate HEP to improve ROM and independence with ADL's. On going  5. Increased MMT  for lower traps/middle traps/rhomboids to > or = 4/5 to increase tolerance for ADL and improve posture. On going     Long Term Goals: 8-10 weeks  1.Report decreased in pain at worse less than  <   / =  3  /10  to increase tolerance for functional activities   2.Pt to  improve range of motion by 90% to allow for improved functional mobility to allow for improvement in IADLs.   3.Increased B UE/neck MMT  1 grade  to increase tolerance for ADL and work activities.  4.  Pt will report 66 or greater on FOTO neck survey score for neck pain disability to demonstrate decrease in disability and improvement in neck pain.  5. Pt to be Independent with HEP to improve ROM and independence with ADL's  6. Increased MMT  for lower traps/middle traps/rhomboids to > or = 4+/5 to increase tolerance for ADL and improve posture.    Plan     Plan of care Certification: 7/18/2024 to 9/26/24.    Rogelio Dietrich, PTA

## 2024-07-25 ENCOUNTER — CLINICAL SUPPORT (OUTPATIENT)
Dept: REHABILITATION | Facility: OTHER | Age: 44
End: 2024-07-25
Payer: COMMERCIAL

## 2024-07-25 DIAGNOSIS — R29.898 DECREASED ROM OF NECK: Primary | ICD-10-CM

## 2024-07-25 PROCEDURE — 97140 MANUAL THERAPY 1/> REGIONS: CPT

## 2024-07-25 PROCEDURE — 97110 THERAPEUTIC EXERCISES: CPT | Mod: CQ

## 2024-07-25 NOTE — PROGRESS NOTES
"  Physical Therapy Daily Treatment Note     Name: Ramakrishna Vargas  Clinic Number: [id number]    Therapy Diagnosis:   Encounter Diagnosis   Name Primary?    Decreased ROM of neck Yes     Physician: Sherita Mccray P*    Visit Date: 7/25/2024    Physician Orders: PT Eval and Treat   Medical Diagnosis from Referral: M54.2 (ICD-10-CM) - Cervicalgia   Evaluation Date: 7/18/2024  Authorization Period Expiration: 7/12/25  Plan of Care Expiration: 9/26/24  Visit # / Visits authorized: 3/20 Reassessment due: 8/18/24  FOTO 1/3    Time In: 2:30 PM  Time Out: 2:42 PM   Total Billable Time: 12 minutes    Precautions: Standard (peripheralizes quickly and severley with EX)    Subjective     Pt reports: the needling has been helping.     He was compliant with home exercise program.  Response to previous treatment: Eval  Functional change: Ongoing     Pain: 4/10  Location: R shoulder, Tricep     Objective     Ramakrishna received therapeutic exercises to develop ROM and flexibility for 0 minutes including:    - UBE 3/2'    - Supine chin tucks, -unable to perform   -Shoulder shrugs- inc symptoms  - cervical flexion 2 x 10 x 5"  - Shoulder rolls ant/post x 20 ea   - Wand flexion 3# x 20  -Supine horizontal abd BTB 2 x 10 x 5"  -Open books x 15 ea (inc symptoms)  -Standing open books x 10 ea   -D2 flexion RTB 2 x 10 (R)  -Seated bilateral ER B TB 2 x 10 x 5"  -MedX rows 80# 2 x 10       Ramakrishna received the following manual therapy techniques: Joint mobilizations, Manual traction, Myofacial release, and Soft tissue Mobilization were applied to the:  for 12 minutes, including:    FDN fanning to R UT and x 2 points to C4-C7 cervical multifidi with 4 hz estim x 10 minutes with good tolerance and minimal increase in adverse symptoms (had to have patient in cervical flexion on high/low table).     Ramakrishna participated in neuromuscular re-education activities to improve: Coordination and Posture for 00 minutes. The following activities were " included:          Home Exercises Provided and Patient Education Provided     Education provided:   - Discontinue HEP that exacerbates symptoms    Written Home Exercises Provided: Patient instructed to cont prior HEP.  Exercises were reviewed and Ramakrishna was able to demonstrate them prior to the end of the session.  Ramakrishna demonstrated good  understanding of the education provided.     See EMR under Patient Instructions for exercises provided 7/18/2024.  Assessment   Ramakrishna received dry needling of bilateral cervical paraspinals and R UT today. No adverse effects noted and pt reporting decreased tightness and soreness reported after application.     Ramakrishna Is progressing well towards his goals.   Pt prognosis is Good.     Pt will continue to benefit from skilled outpatient physical therapy to address the deficits listed in the problem list box on initial evaluation, provide pt/family education and to maximize pt's level of independence in the home and community environment.     Pt's spiritual, cultural and educational needs considered and pt agreeable to plan of care and goals.     Anticipated Barriers for therapy: severity of symptoms    GOALS: Short Term Goals: 4-5 weeks  1.Report decreased in pain at worse less than  <   / =  6  /10  to increase tolerance for functional activities. On going  2. Pt to improve cervical range of motion by 75% to allow for improved functional mobility to allow for improvement in IADLs.  On going  3. Increased  Cervical/UE MMT 1/2  grade to increase tolerance for ADL and work activities. On going  4. Pt to tolerate HEP to improve ROM and independence with ADL's. On going  5. Increased MMT  for lower traps/middle traps/rhomboids to > or = 4/5 to increase tolerance for ADL and improve posture. On going     Long Term Goals: 8-10 weeks  1.Report decreased in pain at worse less than  <   / =  3  /10  to increase tolerance for functional activities   2.Pt to improve range of motion by 90% to  allow for improved functional mobility to allow for improvement in IADLs.   3.Increased B UE/neck MMT  1 grade  to increase tolerance for ADL and work activities.  4.  Pt will report 66 or greater on FOTO neck survey score for neck pain disability to demonstrate decrease in disability and improvement in neck pain.  5. Pt to be Independent with HEP to improve ROM and independence with ADL's  6. Increased MMT  for lower traps/middle traps/rhomboids to > or = 4+/5 to increase tolerance for ADL and improve posture.    Plan     Plan of care Certification: 7/18/2024 to 9/26/24.    Glen Salomon, PT   07/25/2024

## 2024-07-26 NOTE — PROGRESS NOTES
"  Physical Therapy Daily Treatment Note     Name: Ramakrishna Vargas  Clinic Number: [id number]    Therapy Diagnosis:   Encounter Diagnosis   Name Primary?    Decreased ROM of neck Yes     Physician: Sherita Mccray P*    Visit Date: 7/29/2024    Physician Orders: PT Eval and Treat   Medical Diagnosis from Referral: M54.2 (ICD-10-CM) - Cervicalgia   Evaluation Date: 7/18/2024  Authorization Period Expiration: 7/12/25  Plan of Care Expiration: 9/26/24  Visit # / Visits authorized: 3/ 20 (+eval) Reassessment due: 8/18/24  FOTO 1/3    Time In: 2:35 pm   Time Out: 3:35 pm  Total Billable Time: 60 minutes    Precautions: Standard (peripheralizes quickly and severley with EX)    Subjective     Pt reports: that he is feeling about the same since evaluation. However, he feels like he is having some pain going down into flexion right as well.  He was compliant with home exercise program.  Response to previous treatment: Felt better  Functional change: Ongoing     Pain: 3/10  Location: R shoulder, Tricep     Objective     Cervical Range of Motion:     Degrees Pain   Flexion 90% Increase in symptoms      Extension Neutral Y      Right Side Bending WNL In a flexed position     Left Side Bending WNL In a flexed position      Right Rotation WNL In a flexed position   Left Rotation WNL In a flexed position         Treatment      Ramakrishna received therapeutic exercises to develop ROM and flexibility for 23 minutes including:    - Seated UBE 3 min F/3 min B  - Supine chin tucks, towel under neck- NP   - Shoulder shrugs x 10 reps 3 second holds  - Wand flexion 3# x 20- NP  -Standing open books x 10 ea - NP  -Seated bilateral ER B TB 2 x 10 x 5"- NP  -MedX rows 60# 2 x 10 3 second holds  -Wall slides with towel x 10   - Seated thoracic extension with bolster 15 x 5"    Ramakrishna received the following manual therapy techniques: Joint mobilizations, Manual traction, Myofacial release, and Soft tissue Mobilization were applied to the:  for 23 " "minutes, including:  STM to R UT    FDN to R UT with fanning and multiple twitches and x 2 points to R C3-C6 and R posterior shoulder and deltoid x 2 points 4 hz estim x 10 minutes with good tolerance and no increase in adverse symptoms.    Rigid taping into shrug on the R side    Ramakrishna participated in neuromuscular re-education activities to improve: Coordination and Posture for 8 minutes. The following activities were included:  -Supine horizontal abd G TB  2 x 10 x 5"  -D2 flexion  G TB 2 x 10 ea    MedX wellness cervical ext (seat 120; ROM ) 90 lbs x 15 0/10 (started to inc symptoms at end)- NP    MH to R shoulder/neck x 6 minutes with good tolerance and no increase in adverse symptoms.    Home Exercises Provided and Patient Education Provided     Education provided:   - Discontinue HEP that exacerbates symptoms    Written Home Exercises Provided: Patient instructed to cont prior HEP.  Exercises were reviewed and Ramakrishna was able to demonstrate them prior to the end of the session.  Ramakrishna demonstrated good  understanding of the education provided.     See EMR under Patient Instructions for exercises provided 7/29/2024.  Assessment   Patient demonstrated improvement in symptoms after FDN and exercises with ability to get slightly past neutral with less symptoms and no increase in symptoms with flexion. Updated exercises as well as taped R shoulder in shrug to continue to improve symptoms and increase tolerance to activity.    Ramakrishna Is progressing well towards his goals.   Pt prognosis is Good.     Pt will continue to benefit from skilled outpatient physical therapy to address the deficits listed in the problem list box on initial evaluation, provide pt/family education and to maximize pt's level of independence in the home and community environment.     Pt's spiritual, cultural and educational needs considered and pt agreeable to plan of care and goals.     Anticipated Barriers for therapy: severity of " symptoms    GOALS: Short Term Goals: 4-5 weeks  1.Report decreased in pain at worse less than  <   / =  6  /10  to increase tolerance for functional activities. On going  2. Pt to improve cervical range of motion by 75% to allow for improved functional mobility to allow for improvement in IADLs.  On going  3. Increased  Cervical/UE MMT 1/2  grade to increase tolerance for ADL and work activities. On going  4. Pt to tolerate HEP to improve ROM and independence with ADL's. On going  5. Increased MMT  for lower traps/middle traps/rhomboids to > or = 4/5 to increase tolerance for ADL and improve posture. On going     Long Term Goals: 8-10 weeks  1.Report decreased in pain at worse less than  <   / =  3  /10  to increase tolerance for functional activities   2.Pt to improve range of motion by 90% to allow for improved functional mobility to allow for improvement in IADLs.   3.Increased B UE/neck MMT  1 grade  to increase tolerance for ADL and work activities.  4.  Pt will report 66 or greater on FOTO neck survey score for neck pain disability to demonstrate decrease in disability and improvement in neck pain.  5. Pt to be Independent with HEP to improve ROM and independence with ADL's  6. Increased MMT  for lower traps/middle traps/rhomboids to > or = 4+/5 to increase tolerance for ADL and improve posture.    Plan     Plan of care Certification: 7/18/2024 to 9/26/24.    William Brewster, PT

## 2024-07-29 ENCOUNTER — PATIENT MESSAGE (OUTPATIENT)
Dept: REHABILITATION | Facility: OTHER | Age: 44
End: 2024-07-29

## 2024-07-29 ENCOUNTER — CLINICAL SUPPORT (OUTPATIENT)
Dept: REHABILITATION | Facility: OTHER | Age: 44
End: 2024-07-29
Payer: COMMERCIAL

## 2024-07-29 DIAGNOSIS — R29.898 DECREASED ROM OF NECK: Primary | ICD-10-CM

## 2024-07-29 PROCEDURE — 97110 THERAPEUTIC EXERCISES: CPT

## 2024-07-29 PROCEDURE — 97112 NEUROMUSCULAR REEDUCATION: CPT

## 2024-07-29 PROCEDURE — 97140 MANUAL THERAPY 1/> REGIONS: CPT

## 2024-07-31 ENCOUNTER — PATIENT MESSAGE (OUTPATIENT)
Dept: ORTHOPEDICS | Facility: CLINIC | Age: 44
End: 2024-07-31
Payer: COMMERCIAL

## 2024-07-31 ENCOUNTER — TELEPHONE (OUTPATIENT)
Dept: PAIN MEDICINE | Facility: CLINIC | Age: 44
End: 2024-07-31
Payer: COMMERCIAL

## 2024-08-01 ENCOUNTER — CLINICAL SUPPORT (OUTPATIENT)
Dept: REHABILITATION | Facility: OTHER | Age: 44
End: 2024-08-01
Payer: COMMERCIAL

## 2024-08-01 DIAGNOSIS — R29.898 DECREASED ROM OF NECK: Primary | ICD-10-CM

## 2024-08-01 PROCEDURE — 97140 MANUAL THERAPY 1/> REGIONS: CPT | Mod: CQ

## 2024-08-01 PROCEDURE — 97110 THERAPEUTIC EXERCISES: CPT | Mod: CQ

## 2024-08-01 PROCEDURE — 97112 NEUROMUSCULAR REEDUCATION: CPT | Mod: CQ

## 2024-08-01 NOTE — PROGRESS NOTES
"  Physical Therapy Daily Treatment Note     Name: Ramakrishna Vargas  Clinic Number: [id number]    Therapy Diagnosis:   Encounter Diagnosis   Name Primary?    Decreased ROM of neck Yes       Physician: Sherita Mccray P*    Visit Date: 8/1/2024    Physician Orders: PT Eval and Treat   Medical Diagnosis from Referral: M54.2 (ICD-10-CM) - Cervicalgia   Evaluation Date: 7/18/2024  Authorization Period Expiration: 7/12/25  Plan of Care Expiration: 9/26/24  Visit # / Visits authorized: 3/ 20 (+eval) Reassessment due: 8/18/24  FOTO 1/3    Time In: 2:31 pm   Time Out: 325 PM   Total Billable Time: 54 minutes    Precautions: Standard (peripheralizes quickly and severley with EX)    Subjective     Pt reports: He felt a lot better after last session. He had some pain after driving for awhile but now it has improved. He feels like he is getting better.  He was compliant with home exercise program.  Response to previous treatment: Felt better  Functional change: Ongoing     Pain: 3/10  Location: R shoulder, Tricep     Objective     Cervical Range of Motion:     Degrees Pain   Flexion 90% Increase in symptoms      Extension Neutral Y      Right Side Bending WNL In a flexed position     Left Side Bending WNL In a flexed position      Right Rotation WNL In a flexed position   Left Rotation WNL In a flexed position         Treatment      Ramakrishna received therapeutic exercises to develop ROM and flexibility for 29 minutes including:    - Seated UBE 3 min F/3 min B  - Supine chin tucks, towel under neck- NP   - Shoulder shrugs x 10 reps 3 second holds  - Wand flexion 3# x 20- NP  -Standing open books x 10 ea     -MedX rows 60# 2 x 10 3 second holds  -Wall slides with towel x 10   - Seated thoracic extension with bolster 15 x 5"  -Doorway pec stretch 3 x 30"    Ramakrishna received the following manual therapy techniques: Joint mobilizations, Manual traction, Myofacial release, and Soft tissue Mobilization were applied to the:  for 10 minutes, " "including:  STM to R UT  Sub occipital release    FDN to R UT with fanning and multiple twitches and x 2 points to R C3-C6 and R posterior shoulder and deltoid x 2 points 4 hz estim x 00 minutes with good tolerance and no increase in adverse symptoms.      Ramakrishna participated in neuromuscular re-education activities to improve: Coordination and Posture for 15 minutes. The following activities were included:  -Supine horizontal abd G TB  2 x 10 x 5" on 1/2 foam at wall  -D2 flexion  G TB 2 x 10 ea on 1/2 foam at wall  -Bilateral ER GTB 2 x 10 ea on 1/2 foam at wall    MedX wellness cervical ext (seat 120; ROM ) 112 lbs x 15 3/10    MH to R shoulder/neck x 6 minutes with good tolerance and no increase in adverse symptoms.    Home Exercises Provided and Patient Education Provided     Education provided:   - Discontinue HEP that exacerbates symptoms    Written Home Exercises Provided: Patient instructed to cont prior HEP.  Exercises were reviewed and Ramakrishna was able to demonstrate them prior to the end of the session.  Ramakrishna demonstrated good  understanding of the education provided.     See EMR under Patient Instructions for exercises provided 7/29/2024.  Assessment   Ramakrishna presents to therapy with low levels of pain. Pt was able to show improved ROM on cervical medX though as repetitions increased pt was unable to maintain ROM without increasing R arm symptoms. Progressed periscapular strengthening to standing with 1/2 foam for improved postural awareness.     Ramakrishna Is progressing well towards his goals.   Pt prognosis is Good.     Pt will continue to benefit from skilled outpatient physical therapy to address the deficits listed in the problem list box on initial evaluation, provide pt/family education and to maximize pt's level of independence in the home and community environment.     Pt's spiritual, cultural and educational needs considered and pt agreeable to plan of care and goals.     Anticipated Barriers " for therapy: severity of symptoms    GOALS: Short Term Goals: 4-5 weeks  1.Report decreased in pain at worse less than  <   / =  6  /10  to increase tolerance for functional activities. On going  2. Pt to improve cervical range of motion by 75% to allow for improved functional mobility to allow for improvement in IADLs.  On going  3. Increased  Cervical/UE MMT 1/2  grade to increase tolerance for ADL and work activities. On going  4. Pt to tolerate HEP to improve ROM and independence with ADL's. On going  5. Increased MMT  for lower traps/middle traps/rhomboids to > or = 4/5 to increase tolerance for ADL and improve posture. On going     Long Term Goals: 8-10 weeks  1.Report decreased in pain at worse less than  <   / =  3  /10  to increase tolerance for functional activities   2.Pt to improve range of motion by 90% to allow for improved functional mobility to allow for improvement in IADLs.   3.Increased B UE/neck MMT  1 grade  to increase tolerance for ADL and work activities.  4.  Pt will report 66 or greater on FOTO neck survey score for neck pain disability to demonstrate decrease in disability and improvement in neck pain.  5. Pt to be Independent with HEP to improve ROM and independence with ADL's  6. Increased MMT  for lower traps/middle traps/rhomboids to > or = 4+/5 to increase tolerance for ADL and improve posture.    Plan     Plan of care Certification: 7/18/2024 to 9/26/24.    Rogelio Dietrich, PTA

## 2024-08-05 NOTE — PRE-PROCEDURE INSTRUCTIONS
Patient reviewed on 8/5/2024.  Okay to proceed at [place]. The following pre-procedure instructions and arrival time have been reviewed with patient via phone and sent to patient portal for review.  Patient verbalized an understanding.  Pt to be accompanied by his Mom day of procedure as responsible .    Dear Ramakrishna ,     Please read over the following pre-procedure instructions in it's entirety as there is helpful information here to get you well prepared for your upcoming procedure.              You are scheduled for a procedure with Dr. Bernard on 8/7/2024.    Your scheduled arrival time is 9:20 am.  This arrival time is roughly 1 hour before your anticipated procedure time to allow sufficient time for pre-op..    Please wear comfortable clothes. You will be placed in a gown for your procedure.  Please do not wear a dress.  This procedure will take place at the [facility] at the corner of [facility] and [facility].  It is in the [place] next to [facility].  The address is:     [address] [place] [address].  [place] [id number]     After entering the building, you will proceed to the second floor where you can check in with registration. You should take any medications that you routinely take for blood pressure, heart medications, thyroid, cholesterol, etc.      The fasting restrictions are dependent on whether or not you are receiving sedation.  Sedation is not available for all procedures.      Your fasting instructions are as follow:  IV sedation. You should not eat for 8 hours and can only drink clear liquids (water or black coffee without cream/sugar) up until 2 hours before your scheduled time.  You CANNOT drive yourself and must have a .     If you are on blood thinners, you need to follow the anticoagulation instructions that had been discussed previously.  You should only stop the blood thinners if it was approved by your primary care physician or your  cardiologist.  In the event that you are not able to stop your blood thinners, a blood thinner was not listed on your medication list, or we were not able to get clearance from your cardiologist, then the procedure may have to be postponed/canceled.      IF you were told to stop your blood thinners, this is how long you should generally hold some of the more common ones.  Remember that stopping blood thinners is only necessary for certain procedures. If you are unsure of your instructions, please call us.   Aspirin - 5 days  Plavix/Clopidogrel - 7 days  Warfarin / Coumadin - 5 days  Eliquis - 3 days  Pradaxa/Dabigatran - 4 days  Xarelto/Rivaroxaban - 3 days     If you are a diabetic, do not take your medication if you will be fasting, but bring it with you. Please plan on being here for roughly 3 hours.     Please call us if you have been sick (running fever, having any flu-like symptoms) or have been taking ANTIBIOTICS in the past 2 weeks or had any outpatient procedures other than with us (colonoscopy, endoscopy, OBGYN, dental, etc.).     If you have been previously COVID positive, you will need to hold off on your procedure until you are symptom free for 10 days. If you did not have any symptoms, you can have your procedure 10 days from your positive test result.       On the morning of your procedure:  *HOLD ALL VITAMINS, MINERALS, HERBS (INCLUDING HERBAL TEAS) AND SUPPLEMENTS  *SHOWER WITH ANTIBACTERIAL SOAP (EX. DIAL) NIGHT BEFORE AND MORNING OF PROCEDURE  *DO NOT APPLY ANY LOTIONS, OILS, POWDERS, PERFUME/COLOGNE, OINTMENTS, GELS, CREAMS, MAKEUP OR DEODORANT TO YOUR SKIN MORNING OF PROCEDURE  *LEAVE JEWELRY AND ANY VALUABLES AT HOME  *WEAR LOOSE COMFORTABLE CLOTHING (PREFERABLY A BUTTON UP SHIRT)     Please reply to this message as receipt of delivery.     Thank you,  [facility]  [place], LPN [facility]  Pre-Admit

## 2024-08-06 ENCOUNTER — TELEPHONE (OUTPATIENT)
Dept: PAIN MEDICINE | Facility: CLINIC | Age: 44
End: 2024-08-06
Payer: COMMERCIAL

## 2024-08-07 ENCOUNTER — HOSPITAL ENCOUNTER (OUTPATIENT)
Facility: HOSPITAL | Age: 44
Discharge: HOME | End: 2024-08-07
Attending: STUDENT IN AN ORGANIZED HEALTH CARE EDUCATION/TRAINING PROGRAM | Admitting: STUDENT IN AN ORGANIZED HEALTH CARE EDUCATION/TRAINING PROGRAM
Payer: COMMERCIAL

## 2024-08-07 VITALS
BODY MASS INDEX: 26.79 KG/M2 | DIASTOLIC BLOOD PRESSURE: 82 MMHG | SYSTOLIC BLOOD PRESSURE: 128 MMHG | TEMPERATURE: 98 F | WEIGHT: 220 LBS | RESPIRATION RATE: 18 BRPM | OXYGEN SATURATION: 97 % | HEART RATE: 61 BPM | HEIGHT: 76 IN

## 2024-08-07 DIAGNOSIS — G89.29 CHRONIC PAIN: ICD-10-CM

## 2024-08-07 DIAGNOSIS — M50.30 DDD (DEGENERATIVE DISC DISEASE), CERVICAL: ICD-10-CM

## 2024-08-07 DIAGNOSIS — M54.12 CERVICAL RADICULOPATHY: Primary | ICD-10-CM

## 2024-08-07 PROCEDURE — 62321 NJX INTERLAMINAR CRV/THRC: CPT | Mod: ,,, | Performed by: STUDENT IN AN ORGANIZED HEALTH CARE EDUCATION/TRAINING PROGRAM

## 2024-08-07 PROCEDURE — 25000003 PHARM REV CODE 250: Performed by: STUDENT IN AN ORGANIZED HEALTH CARE EDUCATION/TRAINING PROGRAM

## 2024-08-07 PROCEDURE — 63600175 PHARM REV CODE 636 W HCPCS: Performed by: STUDENT IN AN ORGANIZED HEALTH CARE EDUCATION/TRAINING PROGRAM

## 2024-08-07 PROCEDURE — 62321 NJX INTERLAMINAR CRV/THRC: CPT | Performed by: STUDENT IN AN ORGANIZED HEALTH CARE EDUCATION/TRAINING PROGRAM

## 2024-08-07 PROCEDURE — 25500020 PHARM REV CODE 255: Performed by: STUDENT IN AN ORGANIZED HEALTH CARE EDUCATION/TRAINING PROGRAM

## 2024-08-07 RX ORDER — SODIUM CHLORIDE 9 MG/ML
INJECTION, SOLUTION INTRAVENOUS CONTINUOUS
Status: DISCONTINUED | OUTPATIENT
Start: 2024-08-07 | End: 2024-08-07 | Stop reason: HOSPADM

## 2024-08-07 RX ORDER — LIDOCAINE HYDROCHLORIDE 20 MG/ML
INJECTION, SOLUTION EPIDURAL; INFILTRATION; INTRACAUDAL; PERINEURAL
Status: DISCONTINUED | OUTPATIENT
Start: 2024-08-07 | End: 2024-08-07 | Stop reason: HOSPADM

## 2024-08-07 RX ORDER — ALPRAZOLAM 0.5 MG/1
0.5 TABLET, ORALLY DISINTEGRATING ORAL ONCE
Status: DISCONTINUED | OUTPATIENT
Start: 2024-08-07 | End: 2024-08-07

## 2024-08-07 RX ORDER — DEXAMETHASONE SODIUM PHOSPHATE 10 MG/ML
INJECTION INTRAMUSCULAR; INTRAVENOUS
Status: DISCONTINUED | OUTPATIENT
Start: 2024-08-07 | End: 2024-08-07 | Stop reason: HOSPADM

## 2024-08-07 RX ORDER — ALPRAZOLAM 0.5 MG/1
0.5 TABLET, ORALLY DISINTEGRATING ORAL ONCE
Status: COMPLETED | OUTPATIENT
Start: 2024-08-07 | End: 2024-08-07

## 2024-08-07 RX ADMIN — ALPRAZOLAM 0.5 MG: 0.5 TABLET, ORALLY DISINTEGRATING ORAL at 10:08

## 2024-08-07 NOTE — DISCHARGE SUMMARY
Discharge Note  Short Stay      SUMMARY     Admit Date: 8/7/2024    Attending Physician: Vero Bernard      Discharge Physician: Vero Bernard      Discharge Date: 8/7/2024 11:04 AM    Procedure(s) (LRB):  C7-T1 YOVANY (N/A)    Final Diagnosis: Cervical radiculopathy [M54.12]    Disposition: Home or self care    Patient Instructions:   Current Discharge Medication List        CONTINUE these medications which have NOT CHANGED    Details   EScitalopram oxalate (LEXAPRO) 10 MG tablet       gabapentin (NEURONTIN) 300 MG capsule Take 600 mg by mouth 3 (three) times daily.      methocarbamoL (ROBAXIN) 500 MG Tab Take 2 tablets (1,000 mg total) by mouth 3 (three) times daily.  Qty: 180 tablet, Refills: 0    Associated Diagnoses: Right shoulder pain, unspecified chronicity; Cervicalgia      naproxen (NAPROSYN) 500 MG tablet Take 1 tablet (500 mg total) by mouth 2 (two) times daily with meals.  Qty: 60 tablet, Refills: 1    Associated Diagnoses: Right shoulder pain, unspecified chronicity; Cervicalgia      buPROPion (WELLBUTRIN) 100 MG tablet       ketoconazole (NIZORAL) 2 % cream Apply to affected areas of face BID prn scaling.  Qty: 60 g, Refills: 5    Associated Diagnoses: Seborrheic dermatitis      meloxicam (MOBIC) 15 MG tablet Take 1 tablet (15 mg total) by mouth once daily.  Qty: 28 tablet, Refills: 0    Associated Diagnoses: Acute pain of left shoulder      moxifloxacin (VIGAMOX) 0.5 % ophthalmic solution                  Discharge Diagnosis: Cervical radiculopathy [M54.12]  Condition on Discharge: Stable with no complications to procedure   Diet on Discharge: Same as before.  Activity: as per instruction sheet.  Discharge to: Home with a responsible adult.  Follow up: 2-4 weeks       Please call my office or pager at [phone] if experienced any weakness or loss of sensation, fever > 101.5, pain uncontrolled with oral medications, persistent nausea/vomiting/or diarrhea, redness or drainage from the  incisions, or any other worrisome concerns. If physician on call was not reached or could not communicate with our office for any reason please go to the nearest emergency department

## 2024-08-07 NOTE — OP NOTE
Cervical Interlaminar Epidural Steroid Injection under Fluoroscopic Guidance    The procedure, risks, benefits, and options were discussed with the patient. There are no contraindications to the procedure. The patent expressed understanding and agreed to the procedure. Informed written consent was obtained prior to the start of the procedure and can be found in the patient's chart.     PATIENT NAME: Ramakrishna Vargas   MRN: [id number]     DATE OF PROCEDURE: 08/07/2024    PROCEDURE: Cervical Interlaminar Epidural Steroid Injection C7/T1 under Fluoroscopic Guidance    PRE-OP DIAGNOSIS: Cervical radiculopathy [M54.12] Cervical radiculopathy [M54.12]    POST-OP DIAGNOSIS: Same    PHYSICIAN: Vero Bernard DO     ASSISTANTS: None    MEDICATIONS INJECTED: Preservative-free Decadron 10mg and preservative free normal saline    LOCAL ANESTHETIC INJECTED: Xylocaine 2%     SEDATION: None    ESTIMATED BLOOD LOSS: None    COMPLICATIONS: None    TECHNIQUE: Time-out was performed to identify the patient and procedure to be performed. With the patient laying in a prone position, the surgical area was prepped and draped in the usual sterile fashion using ChloraPrep and a fenestrated drape. The level was determined under fluoroscopy guidance. Skin anesthesia was achieved by injecting Lidocaine 2% over the injection site.  The interlaminar space was then approached with a 20 gauge, 3.5 inch Tuohy needle that was introduced under fluoroscopic guidance with AP, lateral and/or contralateral oblique imaging. Once the Ligamentum flavum was encountered loss of resistance to saline was used to enter the epidural space. With positive loss of resistance and negative aspiration for CSF or Blood, contrast dye  Omnipaque (300mg/mL) was injected to confirm placement and there was no vascular runoff. Then 4 mL of the medication mixture listed above was then injected slowly. Displacement of the radio opaque contrast after injection of the medication  confirmed that the medication went into the area of the epidural space. The needles were removed, and bleeding was nil. A sterile dressing was applied. No specimens collected. The patient tolerated the procedure well.     The patient was monitored after the procedure in the recovery area. They were given post-procedure and discharge instructions to follow at home. The patient was discharged in a stable condition.        Vero Bernard DO

## 2024-08-07 NOTE — DISCHARGE INSTRUCTIONS
Home Care Instructions Pain Management:    1.  DIET:    You may resume your normal diet today.    2.  BATHING:    You may shower with luke warm water.    3.  DRESSING:    You may remove your bandage today.    4.  ACTIVITY LEVEL:      You may resume your normal activities 24 hours after your procedure.    5.  MEDICATIONS:    You may resume your normal medications today.    6.  SPECIAL INSTRUCTIONS:    No heat to the injection site for 24 hours including bath or shower, heating pad, moist heat or hot tubs.    Use an ice pack to the injection site for any pain or discomfort.  Apply ice packs for 20 minute intervals as needed.    If you have received any sedatives by mouth today, you can not drive for 12 hours.    If you have received sedation through an IV, you can not drive for 24 hours.    PLEASE CALL YOUR DOCTOR FOR THE FOLLOWIN.  Redness or swelling around the injection site.  2.  Fever of 101 degrees.  3.  Drainage (pus) from the injection site.  4.  For any continuous bleeding (some dried blood over the incision is normal.)    FOR EMERGENCIES:    If any unusual problems or difficulties occur during clinic hours, call [phone] or dial [phone].    Follow up with with your physician in 2-3 weeks.

## 2024-08-09 ENCOUNTER — TELEPHONE (OUTPATIENT)
Dept: PAIN MEDICINE | Facility: CLINIC | Age: 44
End: 2024-08-09
Payer: COMMERCIAL

## 2024-08-12 ENCOUNTER — CLINICAL SUPPORT (OUTPATIENT)
Dept: REHABILITATION | Facility: OTHER | Age: 44
End: 2024-08-12
Payer: COMMERCIAL

## 2024-08-12 DIAGNOSIS — R29.898 DECREASED ROM OF NECK: Primary | ICD-10-CM

## 2024-08-12 PROCEDURE — 97140 MANUAL THERAPY 1/> REGIONS: CPT

## 2024-08-12 PROCEDURE — 97110 THERAPEUTIC EXERCISES: CPT

## 2024-08-12 PROCEDURE — 97530 THERAPEUTIC ACTIVITIES: CPT

## 2024-08-12 NOTE — PROGRESS NOTES
Physical Therapy Reassessment/Daily Treatment Note     Name: Ramakrishna Vargas  [facility] Number: [id number]    Therapy Diagnosis:   Encounter Diagnosis   Name Primary?    Decreased ROM of neck Yes     Physician: Sherita Mccray P*    Visit Date: 8/12/2024    Physician Orders: PT Eval and Treat   Medical Diagnosis from Referral: M54.2 (ICD-10-CM) - Cervicalgia   Evaluation Date: 7/18/2024  Authorization Period Expiration: 7/12/25  Plan of Care Expiration: 9/26/24  Visit # / Visits authorized: 5/ 20 (+eval) Reassessment due: 9/12/24  FOTO 2/3    Time In: 2:35 pm   Time Out: 3:25 PM (had to leave early)  Total Billable Time: 50 minutes    Precautions: Standard (peripheralizes with EX)    Subjective     Pt reports: that he got the steroid shot last week and has gotten some minimal relief.  He was compliant with home exercise program.  Response to previous treatment: Felt better  Functional change: improvement in pain/adverse symptoms    Pain: 3/10  Location: R shoulder, Tricep     Objective     Cervical Range of Motion:     Degrees Pain   Flexion 55 N      Extension 25 Y      Right Side Bending 45 In neutral     Left Side Bending 40 In neutral      Right Rotation 50 In neutral   Left Rotation 55 In neutral     Strength:  Cervical MMT   Flexion 4+/5   Extension 4/5   Right Side Bend 4+/5   Left Side Bend 4+/5      Upper Extremity Strength  (R) UE   (L) UE     Shoulder elevation: 5/5 Shoulder elevation: 4+/5   Shoulder flexion: 4/5 Shoulder flexion: 5/5   Shoulder Abduction: 4/5 Shoulder abduction: 5/5   Shoulder ER 4/5 Shoulder ER 5/5   Shoulder IR 4/5 Shoulder IR 5/5   Elbow flexion: 4+/5 Elbow flexion: 5/5   Elbow extension: 4/5 Elbow extension: 5/5   Wrist flexion: 4+/5 Wrist flexion: 5/5   Wrist extension: 4+/5 Wrist extension: 5/5    DNT : DNT   Lower Trap 4/5 Lower Trap 4/5   Middle Trap 4/5 Middle Trap 4+/5   Rhomboids 4+/5 Rhomboids 5/5       Upper Limb Neurodynamic testing:    Right Left   UNT Neg Neg  "  MNT Pos Neg   RNT Pos Neg      Joint Mobility: hypomobile into cervical EX     Thoracic mobility: hypomobile     Palpation: TTP to R UT and to cervical paraspinals  and lumbar L sided paraspinals     CMS Impairment/Limitation/Restriction for FOTO Neck Survey     Therapist reviewed FOTO scores for Ramakrishna Vargas on 8/12/2024.   FOTO documents entered into [employer] - see Media section.     Limitation Score: 50     Goal Score: 65     Treatment      Ramakrishna received therapeutic exercises to develop ROM and flexibility for 12 minutes including:    - Standing UBE 3 min F/3 min B for joint nutrition  - Supine chin tucks with pillow doubled folded 2 x 10 5 second holds  - Prone retraction x 10 reps- increase in arm pain  - Wand flexion 3# x 20- NP  -Standing open books x 10 ea- NP  -MedX rows 60# 2 x 10 3 second holds- NP  -Wall slides with towel x 10- NP  - Seated thoracic extension with bolster 15 x 5"- NP  -Doorway pec stretch 3 x 30"- NP    Ramakrishna participated in therapeutic activities to improve: functional mobility and tasks for 15 minutes. The following activities were included:  Reassessment  HEP review    Ramakrishna received the following manual therapy techniques: Joint mobilizations, Manual traction, Myofacial release, and Soft tissue Mobilization were applied to the: for 23 minutes, including:  STM to R UT  L lumbar unilateral P/As grade 2/3    FDN to B UT with fanning and multiple twitches and x 2 points to R C3-C6 and R posterior shoulder and deltoid x 2 points and L lumbar multifidi x L2-L4 x 2 points with 4 hz estim x 10 minutes with good tolerance and no increase in adverse symptoms.    Ramakrishna participated in neuromuscular re-education activities to improve: Coordination and Posture for 0 minutes. The following activities were included:  NA today    -Supine horizontal abd G TB  2 x 10 x 5" on 1/2 foam at wall  -D2 flexion  G TB 2 x 10 ea on 1/2 foam at wall  -Bilateral ER GTB 2 x 10 ea on 1/2 foam at wall  - MedX " wellness cervical ext (seat 120; ROM ) 112 lbs x 15 3/10    MH to R shoulder/neck x 0 minutes with good tolerance and no increase in adverse symptoms.    Home Exercises Provided and Patient Education Provided     Education provided:   - Discontinue HEP that exacerbates symptoms    Written Home Exercises Provided:   Exercises were reviewed and Ramakrishna was able to demonstrate them prior to the end of the session.  Ramakrishna demonstrated good  understanding of the education provided.     See EMR under Patient Instructions for exercises provided 8/12/2024.  Assessment   Patient has demonstrated significant improvement in pain/adverse symptoms, improvement in cervical ROM, improvement in postural strength, and improvement in tolerance to activity since starting skilled therapy. Patient is still having consistent moderate adverse symptoms with certain activities, motions, and strength limitations limiting quality of life.    Ramakrishna Is progressing well towards his goals.   Pt prognosis is Good.     Pt will continue to benefit from skilled outpatient physical therapy to address the deficits listed in the problem list box on initial evaluation, provide pt/family education and to maximize pt's level of independence in the home and community environment.     Pt's spiritual, cultural and educational needs considered and pt agreeable to plan of care and goals.     Anticipated Barriers for therapy: severity of symptoms    GOALS: Short Term Goals: 4-5 weeks  1.Report decreased in pain at worse less than  <   / =  6  /10  to increase tolerance for functional activities. On going  2. Pt to improve cervical range of motion by 75% to allow for improved functional mobility to allow for improvement in IADLs.  MET  3. Increased  Cervical/UE MMT 1/2  grade to increase tolerance for ADL and work activities.MET  4. Pt to tolerate HEP to improve ROM and independence with ADL's. MET  5. Increased MMT  for lower traps/middle traps/rhomboids to  > or = 4/5 to increase tolerance for ADL and improve posture. MET     Long Term Goals: 8-10 weeks  1.Report decreased in pain at worse less than  <   / =  3  /10  to increase tolerance for functional activities   2.Pt to improve range of motion by 90% to allow for improved functional mobility to allow for improvement in IADLs.   3.Increased B UE/neck MMT  1 grade  to increase tolerance for ADL and work activities.  4.  Pt will report 66 or greater on FOTO neck survey score for neck pain disability to demonstrate decrease in disability and improvement in neck pain.  5. Pt to be Independent with HEP to improve ROM and independence with ADL's  6. Increased MMT  for lower traps/middle traps/rhomboids to > or = 4+/5 to increase tolerance for ADL and improve posture.    Plan     Plan of care Certification: 7/18/2024 to 9/26/24.    William Brewster, PT

## 2024-08-15 NOTE — PROGRESS NOTES
Physical Therapy Daily Treatment Note     Name: Ramakrishna Vargas  Clinic Number: [id number]    Therapy Diagnosis:   Encounter Diagnosis   Name Primary?    Decreased ROM of neck Yes     Physician: Sherita Mccray P*    Visit Date: 8/19/2024    Physician Orders: PT Eval and Treat   Medical Diagnosis from Referral: M54.2 (ICD-10-CM) - Cervicalgia   Evaluation Date: 7/18/2024  Authorization Period Expiration: 7/12/25  Plan of Care Expiration: 9/26/24  Visit # / Visits authorized: 6/ 20 (+eval) Reassessment due: 9/12/24  FOTO 2/3    Time In: 2:35 pm   Time Out: 3:25 PM   Total Billable Time: 50 minutes    Precautions: Standard (peripheralizes with EX)    Subjective     Pt reports: that overall he is feeling better and feels like he can move his neck more with less increase in pain level.  He was compliant with home exercise program.  Response to previous treatment: Felt better  Functional change: improvement in pain/adverse symptoms    Pain: 2/10  Location: R shoulder, Tricep     Objective     Cervical Range of Motion:     Degrees Pain   Flexion 55 N      Extension 25 Y      Right Side Bending 45 In neutral     Left Side Bending 40 In neutral      Right Rotation 50 In neutral   Left Rotation 55 In neutral         CMS Impairment/Limitation/Restriction for FOTO Neck Survey     Therapist reviewed FOTO scores for Ramakrishna Vargas on 8/12/2024.   FOTO documents entered into [employer] - see Media section.     Limitation Score: 50     Goal Score: 65     Treatment      Ramakrishna received therapeutic exercises to develop ROM and flexibility for 23 minutes including:  - Standing UBE 2 min F/2 min B for joint nutrition  - Cat/cow x 15 reps  - Pushups 2 x 10 incline to table  - Prone Y 2 x 10 5 second holds  - Standing open books x 5 ea  - Seated overhead press 12# 3 x 10 SA  - MedX rows 60# 2 x 10 3 second holds- NP  - Seated thoracic extension with bolster 2 x 10 x 5  - Seated band pull aparts + chin tuck 2 x 8 3 second holds    Ramakrishna  "participated in therapeutic activities to improve: functional mobility and tasks for 0 minutes. The following activities were included:  NA today    Ramakrishna received the following manual therapy techniques: Joint mobilizations, Manual traction, Myofacial release, and Soft tissue Mobilization were applied to the: for 23 minutes, including:  STM to R UT  L lumbar unilateral P/As grade 2/3    FDN to B UT with fanning and multiple twitches and x 2 points to R C3-C6 and R posterior shoulder and posterior deltoid x 2 points.    Ramakrishna participated in neuromuscular re-education activities to improve: Coordination and Posture for 4 minutes. The following activities were included:  -Supine horizontal abd G TB  2 x 10 x 5" on 1/2 foam at wall  - MedX wellness cervical ext (seat 120; ROM ) 112 lbs x 15 3/10    MH to R shoulder/neck x 0 minutes with good tolerance and no increase in adverse symptoms.    Home Exercises Provided and Patient Education Provided     Education provided:   - Discontinue HEP that exacerbates symptoms    Written Home Exercises Provided:   Exercises were reviewed and Ramakrishna was able to demonstrate them prior to the end of the session.  Ramakrishna demonstrated good  understanding of the education provided.     See EMR under Patient Instructions for exercises provided 8/12/2024.  Assessment   Patient demonstrated good tolerance to strengthening and increased ROM and weight on cervical med ex machine. Improvement in cervical EX since last session with less increase in radicular symptoms.    Ramakrishna Is progressing well towards his goals.   Pt prognosis is Good.     Pt will continue to benefit from skilled outpatient physical therapy to address the deficits listed in the problem list box on initial evaluation, provide pt/family education and to maximize pt's level of independence in the home and community environment.     Pt's spiritual, cultural and educational needs considered and pt agreeable to plan of care " and goals.     Anticipated Barriers for therapy: severity of symptoms    GOALS: Short Term Goals: 4-5 weeks  1.Report decreased in pain at worse less than  <   / =  6  /10  to increase tolerance for functional activities. On going  2. Pt to improve cervical range of motion by 75% to allow for improved functional mobility to allow for improvement in IADLs.  MET  3. Increased  Cervical/UE MMT 1/2  grade to increase tolerance for ADL and work activities.MET  4. Pt to tolerate HEP to improve ROM and independence with ADL's. MET  5. Increased MMT  for lower traps/middle traps/rhomboids to > or = 4/5 to increase tolerance for ADL and improve posture. MET     Long Term Goals: 8-10 weeks  1.Report decreased in pain at worse less than  <   / =  3  /10  to increase tolerance for functional activities   2.Pt to improve range of motion by 90% to allow for improved functional mobility to allow for improvement in IADLs.   3.Increased B UE/neck MMT  1 grade  to increase tolerance for ADL and work activities.  4.  Pt will report 66 or greater on FOTO neck survey score for neck pain disability to demonstrate decrease in disability and improvement in neck pain.  5. Pt to be Independent with HEP to improve ROM and independence with ADL's  6. Increased MMT  for lower traps/middle traps/rhomboids to > or = 4+/5 to increase tolerance for ADL and improve posture.    Plan     Plan of care Certification: 7/18/2024 to 9/26/24.    William Brewster, PT

## 2024-08-16 ENCOUNTER — OFFICE VISIT (OUTPATIENT)
Dept: ORTHOPEDICS | Facility: CLINIC | Age: 44
End: 2024-08-16
Payer: COMMERCIAL

## 2024-08-16 VITALS — WEIGHT: 220 LBS | BODY MASS INDEX: 26.79 KG/M2 | HEIGHT: 76 IN

## 2024-08-16 DIAGNOSIS — M54.12 CERVICAL RADICULOPATHY: Primary | ICD-10-CM

## 2024-08-16 PROCEDURE — 99999 PR PBB SHADOW E&M-EST. PATIENT-LVL III: CPT | Mod: PBBFAC,,, | Performed by: ORTHOPAEDIC SURGERY

## 2024-08-16 RX ORDER — CYCLOBENZAPRINE HCL 10 MG
10 TABLET ORAL NIGHTLY PRN
Qty: 30 TABLET | Refills: 1 | Status: SHIPPED | OUTPATIENT
Start: 2024-08-16 | End: 2024-10-15

## 2024-08-16 NOTE — PROGRESS NOTES
The patient returns for follow-up.  He has a history of left upper extremity radiculopathy over past 7 weeks.  Had a cervical epidural steroid injection 9 days ago and reports overall he is doing a little bit better.    Today we discussed options, I recommended continued anti-inflammatory medication, muscle relaxers at night, and physical therapy.  He seems to be improving, I do not recommend surgical intervention at this time.  We may consider a repeat epidural steroid injection in the future.

## 2024-08-19 ENCOUNTER — CLINICAL SUPPORT (OUTPATIENT)
Dept: REHABILITATION | Facility: OTHER | Age: 44
End: 2024-08-19
Payer: COMMERCIAL

## 2024-08-19 DIAGNOSIS — R29.898 DECREASED ROM OF NECK: Primary | ICD-10-CM

## 2024-08-19 PROCEDURE — 97140 MANUAL THERAPY 1/> REGIONS: CPT

## 2024-08-19 PROCEDURE — 97110 THERAPEUTIC EXERCISES: CPT

## 2024-08-26 ENCOUNTER — CLINICAL SUPPORT (OUTPATIENT)
Dept: REHABILITATION | Facility: OTHER | Age: 44
End: 2024-08-26
Payer: COMMERCIAL

## 2024-08-26 DIAGNOSIS — R29.898 DECREASED ROM OF NECK: Primary | ICD-10-CM

## 2024-08-26 PROCEDURE — 97140 MANUAL THERAPY 1/> REGIONS: CPT

## 2024-08-26 PROCEDURE — 97110 THERAPEUTIC EXERCISES: CPT

## 2024-08-26 PROCEDURE — 97112 NEUROMUSCULAR REEDUCATION: CPT

## 2024-08-26 NOTE — PROGRESS NOTES
Physical Therapy Daily Treatment Note     Name: Ramakrishna Vargas  Clinic Number: [id number]    Therapy Diagnosis:   Encounter Diagnosis   Name Primary?    Decreased ROM of neck Yes     Physician: Sherita Mccray P*    Visit Date: 8/26/2024    Physician Orders: PT Eval and Treat   Medical Diagnosis from Referral: M54.2 (ICD-10-CM) - Cervicalgia   Evaluation Date: 7/18/2024  Authorization Period Expiration: 7/12/25  Plan of Care Expiration: 9/26/24  Visit # / Visits authorized: 7/ 20 (+eval) Reassessment due: 9/12/24  FOTO 2/3    Time In: 2:44 pm   Time Out: 3:25 PM   Total Billable Time: 41 minutes    Precautions: Standard (peripheralizes with EX)    Subjective     Pt reports: that he was really sore for 3 days after last session. Feels better today.  He was compliant with home exercise program.  Response to previous treatment: Fair  Functional change: ongoing    Pain: 2/10  Location: R shoulder, Tricep     Objective     Cervical Range of Motion:     Degrees Pain   Flexion 55 N      Extension 25 Y      Right Side Bending 45 In neutral     Left Side Bending 40 In neutral      Right Rotation 50 In neutral   Left Rotation 55 In neutral         CMS Impairment/Limitation/Restriction for FOTO Neck Survey     Therapist reviewed FOTO scores for Ramakrishna Vargas on 8/12/2024.   FOTO documents entered into [employer] - see Media section.     Limitation Score: 50     Goal Score: 65     Treatment      Add plank + neck EX isometric next session     Ramakrishna received therapeutic exercises to develop ROM and flexibility for 23 minutes including:  - Standing UBE 2 min F/2 min B for joint nutrition  - Cat/cow x 10 reps  - Pushups 2 x 10 incline to table- NP  - Prone Y 2 x 10 5 second holds- NP  - Standing open books x 10 ea  - Seated overhead press 15# 3 x 10 SA  - MedX rows 70# 2 x 10 3 second holds  - Seated thoracic extension with bolster 2 x 10 x 5- NP  - Seated red thick band pull aparts + chin tuck 3 x 10 3 second holds    Ramakrishna  participated in therapeutic activities to improve: functional mobility and tasks for 0 minutes. The following activities were included:  NA today    Ramakrishna received the following manual therapy techniques: Joint mobilizations, Manual traction, Myofacial release, and Soft tissue Mobilization were applied to the: for 10 minutes, including:  STM to R UT    FDN to R UT with fanning and multiple twitches and x 2 points to R C3-C6 and R posterior shoulder and posterior deltoid x 2 points.    Ramakrishna participated in neuromuscular re-education activities to improve: Coordination and Posture for 8 minutes. The following activities were included:  - Standing red thick band cervical extension 3 x 10 5 second holds  - Novatris cervical ext (seat 120; ROM ) 112 lbs x 15 3/10- NP    MH to R shoulder/neck x 0 minutes with good tolerance and no increase in adverse symptoms.    Home Exercises Provided and Patient Education Provided     Education provided:   - Discontinue HEP that exacerbates symptoms    Written Home Exercises Provided:   Exercises were reviewed and Ramakrishna was able to demonstrate them prior to the end of the session.  Ramakrishna demonstrated good  understanding of the education provided.     See EMR under Patient Instructions for exercises provided 8/12/2024.  Assessment   Patient demonstrated good tolerance to strengthening and FDN this session. Held off on neck EX machine due to possible increase in radicular symptoms with more extension work.    Ramakrishna Is progressing well towards his goals.   Pt prognosis is Good.     Pt will continue to benefit from skilled outpatient physical therapy to address the deficits listed in the problem list box on initial evaluation, provide pt/family education and to maximize pt's level of independence in the home and community environment.     Pt's spiritual, cultural and educational needs considered and pt agreeable to plan of care and goals.     Anticipated Barriers for therapy:  severity of symptoms    GOALS: Short Term Goals: 4-5 weeks  1.Report decreased in pain at worse less than  <   / =  6  /10  to increase tolerance for functional activities. On going  2. Pt to improve cervical range of motion by 75% to allow for improved functional mobility to allow for improvement in IADLs.  MET  3. Increased  Cervical/UE MMT 1/2  grade to increase tolerance for ADL and work activities.MET  4. Pt to tolerate HEP to improve ROM and independence with ADL's. MET  5. Increased MMT  for lower traps/middle traps/rhomboids to > or = 4/5 to increase tolerance for ADL and improve posture. MET     Long Term Goals: 8-10 weeks  1.Report decreased in pain at worse less than  <   / =  3  /10  to increase tolerance for functional activities   2.Pt to improve range of motion by 90% to allow for improved functional mobility to allow for improvement in IADLs.   3.Increased B UE/neck MMT  1 grade  to increase tolerance for ADL and work activities.  4.  Pt will report 66 or greater on FOTO neck survey score for neck pain disability to demonstrate decrease in disability and improvement in neck pain.  5. Pt to be Independent with HEP to improve ROM and independence with ADL's  6. Increased MMT  for lower traps/middle traps/rhomboids to > or = 4+/5 to increase tolerance for ADL and improve posture.    Plan     Plan of care Certification: 7/18/2024 to 9/26/24.    William Brewster, PT

## 2024-09-04 ENCOUNTER — CLINICAL SUPPORT (OUTPATIENT)
Dept: REHABILITATION | Facility: OTHER | Age: 44
End: 2024-09-04
Payer: COMMERCIAL

## 2024-09-04 DIAGNOSIS — R29.898 DECREASED ROM OF NECK: Primary | ICD-10-CM

## 2024-09-04 PROCEDURE — 97140 MANUAL THERAPY 1/> REGIONS: CPT

## 2024-09-04 PROCEDURE — 97112 NEUROMUSCULAR REEDUCATION: CPT

## 2024-09-04 PROCEDURE — 97110 THERAPEUTIC EXERCISES: CPT

## 2024-09-04 NOTE — PROGRESS NOTES
Physical Therapy Daily Treatment Note     Name: Ramakrishna Vargas  Clinic Number: [id number]    Therapy Diagnosis:   Encounter Diagnosis   Name Primary?    Decreased ROM of neck Yes     Physician: Sherita Mccray P*    Visit Date: 9/4/2024    Physician Orders: PT Eval and Treat   Medical Diagnosis from Referral: M54.2 (ICD-10-CM) - Cervicalgia   Evaluation Date: 7/18/2024  Authorization Period Expiration: 7/12/25  Plan of Care Expiration: 9/26/24  Visit # / Visits authorized: 8/ 20 (+eval) Reassessment due: 9/12/24  FOTO 2/3    Time In: 1:40 pm   Time Out: 2:36 PM   Total Billable Time: 56 minutes    Precautions: Standard (peripheralizes with EX)    Subjective     Pt reports: that he is feeling about the same today. Feels it the most when he is driving.  He was compliant with home exercise program.  Response to previous treatment: good  Functional change: improvement in symptoms    Pain: 2/10  Location: R shoulder/ upper trap area    Objective     Cervical Range of Motion:     Degrees Pain   Flexion 55 N      Extension 40 N      Right Side Bending 45 In neutral     Left Side Bending 40 In neutral      Right Rotation 50 In neutral   Left Rotation 55 In neutral      CMS Impairment/Limitation/Restriction for FOTO Neck Survey     Therapist reviewed FOTO scores for Ramakrishna Vargas on 8/12/2024.   FOTO documents entered into [employer] - see Media section.     Limitation Score: 50     Goal Score: 65     Treatment      Ramakrishna received therapeutic exercises to develop ROM and flexibility for 40 minutes including:  - Standing UBE 2 min F/2 min B for joint nutrition  - Cat/cow x 10 reps  - Sidelying open books x 10 ea  - MedX rows 80# 2 x 10 3 second holds  - Standing red thick band  walk outs facing away x 15 reps 5 second holds  3 rounds  - Standing overhead SA walk 20# x 1 lap each side  - Plank + neck retraction isometric G TB x 45 seconds  - Seated red thick band W + chin tuck x 10 3 second holds    Ramakrishna participated in  therapeutic activities to improve: functional mobility and tasks for 0 minutes. The following activities were included:  NA today    Ramakrishna received the following manual therapy techniques: Joint mobilizations, Manual traction, Myofacial release, and Soft tissue Mobilization were applied to the: for 8 minutes, including:  STM to R UT  Scapular mobility  Cervical sideglides grade 2/3    Ramakrishna participated in neuromuscular re-education activities to improve: Coordination and Posture for 8 minutes. The following activities were included:  - Prone behind the back with 1# dowel 2 x 10 3 second holds  - DynadmicX wellness cervical ext (seat 120; ROM ) 142 lbs x 3 minutes    MH to R shoulder/neck x 0 minutes with good tolerance and no increase in adverse symptoms.    Home Exercises Provided and Patient Education Provided     Education provided:   - Discontinue HEP that exacerbates symptoms    Written Home Exercises Provided:   Exercises were reviewed and Ramakrishna was able to demonstrate them prior to the end of the session.  Ramakrishna demonstrated good  understanding of the education provided.     See EMR under Patient Instructions for exercises provided 8/12/2024.  Assessment   Patient demonstrated good tolerance to strengthening and more neck mobility. Noted significant improvement in neck EX ROM without any radicular symptoms.    Ramakrishna Is progressing well towards his goals.   Pt prognosis is Good.     Pt will continue to benefit from skilled outpatient physical therapy to address the deficits listed in the problem list box on initial evaluation, provide pt/family education and to maximize pt's level of independence in the home and community environment.     Pt's spiritual, cultural and educational needs considered and pt agreeable to plan of care and goals.     Anticipated Barriers for therapy: severity of symptoms    GOALS: Short Term Goals: 4-5 weeks  1.Report decreased in pain at worse less than  <   / =  6  /10  to  increase tolerance for functional activities. On going  2. Pt to improve cervical range of motion by 75% to allow for improved functional mobility to allow for improvement in IADLs.  MET  3. Increased  Cervical/UE MMT 1/2  grade to increase tolerance for ADL and work activities.MET  4. Pt to tolerate HEP to improve ROM and independence with ADL's. MET  5. Increased MMT  for lower traps/middle traps/rhomboids to > or = 4/5 to increase tolerance for ADL and improve posture. MET     Long Term Goals: 8-10 weeks  1.Report decreased in pain at worse less than  <   / =  3  /10  to increase tolerance for functional activities   2.Pt to improve range of motion by 90% to allow for improved functional mobility to allow for improvement in IADLs.   3.Increased B UE/neck MMT  1 grade  to increase tolerance for ADL and work activities.  4.  Pt will report 66 or greater on FOTO neck survey score for neck pain disability to demonstrate decrease in disability and improvement in neck pain.  5. Pt to be Independent with HEP to improve ROM and independence with ADL's  6. Increased MMT  for lower traps/middle traps/rhomboids to > or = 4+/5 to increase tolerance for ADL and improve posture.    Plan     Plan of care Certification: 7/18/2024 to 9/26/24.    William Brewster, PT

## 2024-12-27 DIAGNOSIS — J31.0 CHRONIC RHINITIS: ICD-10-CM

## 2025-01-02 RX ORDER — FLUTICASONE PROPIONATE 50 MCG
SPRAY, SUSPENSION (ML) NASAL
Qty: 16 G | Refills: 2 | Status: SHIPPED | OUTPATIENT
Start: 2025-01-02

## 2025-04-02 ENCOUNTER — PATIENT MESSAGE (OUTPATIENT)
Dept: SURGERY | Facility: CLINIC | Age: 45
End: 2025-04-02
Payer: COMMERCIAL

## 2025-04-03 ENCOUNTER — TELEPHONE (OUTPATIENT)
Dept: ENDOSCOPY | Facility: HOSPITAL | Age: 45
End: 2025-04-03
Payer: COMMERCIAL

## 2025-04-03 ENCOUNTER — OFFICE VISIT (OUTPATIENT)
Dept: SURGERY | Facility: CLINIC | Age: 45
End: 2025-04-03
Payer: COMMERCIAL

## 2025-04-03 VITALS
DIASTOLIC BLOOD PRESSURE: 86 MMHG | BODY MASS INDEX: 26.55 KG/M2 | SYSTOLIC BLOOD PRESSURE: 126 MMHG | WEIGHT: 218.06 LBS | HEART RATE: 82 BPM | HEIGHT: 76 IN | RESPIRATION RATE: 18 BRPM

## 2025-04-03 DIAGNOSIS — K62.5 RECTAL BLEEDING: Primary | ICD-10-CM

## 2025-04-03 DIAGNOSIS — Z12.11 COLON CANCER SCREENING: ICD-10-CM

## 2025-04-03 DIAGNOSIS — K64.8 INTERNAL HEMORRHOIDS: Primary | ICD-10-CM

## 2025-04-03 DIAGNOSIS — K62.5 RECTAL BLEEDING: ICD-10-CM

## 2025-04-03 PROCEDURE — 99999 PR PBB SHADOW E&M-EST. PATIENT-LVL III: CPT | Mod: PBBFAC,,, | Performed by: COLON & RECTAL SURGERY

## 2025-04-03 PROCEDURE — 1160F RVW MEDS BY RX/DR IN RCRD: CPT | Mod: CPTII,S$GLB,, | Performed by: COLON & RECTAL SURGERY

## 2025-04-03 PROCEDURE — 3074F SYST BP LT 130 MM HG: CPT | Mod: CPTII,S$GLB,, | Performed by: COLON & RECTAL SURGERY

## 2025-04-03 PROCEDURE — 46600 DIAGNOSTIC ANOSCOPY SPX: CPT | Mod: S$GLB,,, | Performed by: COLON & RECTAL SURGERY

## 2025-04-03 PROCEDURE — 1159F MED LIST DOCD IN RCRD: CPT | Mod: CPTII,S$GLB,, | Performed by: COLON & RECTAL SURGERY

## 2025-04-03 PROCEDURE — 3008F BODY MASS INDEX DOCD: CPT | Mod: CPTII,S$GLB,, | Performed by: COLON & RECTAL SURGERY

## 2025-04-03 PROCEDURE — 3079F DIAST BP 80-89 MM HG: CPT | Mod: CPTII,S$GLB,, | Performed by: COLON & RECTAL SURGERY

## 2025-04-03 PROCEDURE — 99203 OFFICE O/P NEW LOW 30 MIN: CPT | Mod: 25,S$GLB,, | Performed by: COLON & RECTAL SURGERY

## 2025-04-03 RX ORDER — HYDROCORTISONE ACETATE 25 MG/1
25 SUPPOSITORY RECTAL 2 TIMES DAILY
Qty: 20 SUPPOSITORY | Refills: 0 | Status: SHIPPED | OUTPATIENT
Start: 2025-04-03 | End: 2025-04-13

## 2025-04-03 RX ORDER — SODIUM, POTASSIUM,MAG SULFATES 17.5-3.13G
1 SOLUTION, RECONSTITUTED, ORAL ORAL DAILY
Qty: 1 KIT | Refills: 0 | Status: SHIPPED | OUTPATIENT
Start: 2025-04-03 | End: 2025-04-05

## 2025-04-03 NOTE — TELEPHONE ENCOUNTER
"Referral for procedure from [facility]      Spoke to pt to schedule procedure(s) Colonoscopy       Physician to perform procedure(s) Dr. LEANDRO Holder  Date of Procedure (s) 4/17/25  Arrival Time 12:10 PM  Time of Procedure(s) 1:10 PM   Location of Procedure(s) [facility]  Type of Rx Prep sent to patient: Suprep  Instructions provided to patient via Copy in hand    Patient was informed on the following information and verbalized understanding. Screening questionnaire reviewed with patient and complete. If procedure requires anesthesia, a responsible adult needs to be present to accompany the patient home, patient cannot drive after receiving anesthesia. Appointment details are tentative, especially check-in time. Patient will receive a prep-op call 7 days prior to confirm check-in time for procedure. If applicable the patient should contact their pharmacy to verify Rx for procedure prep is ready for pick-up. Patient was advised to call the scheduling department at [phone] if pharmacy states no Rx is available. Patient was advised to call the endoscopy scheduling department if any questions or concerns arise.       Endoscopy Scheduling Department        Jesse Holder MD  [facility]  Procedure: Colonoscopy    Diagnosis: Rectal bleeding    Procedure Timing: Within 4 weeks (Urgent)    *If within 4 weeks selected, please [place] as high priority*    *If greater than 12 weeks, please select "5-12 weeks" and delay sending until 3 months prior to requested date*    Location: Any Site    Additional Scheduling Information: No scheduling concerns    Prep Specifications:Standard prep    Is the patient taking a GLP-1 Agonist:no    Have you attached a patient to this message: yes  "

## 2025-04-03 NOTE — PROGRESS NOTES
"Subjective:       Patient ID: Ramakrishna Vargas is a 45 y.o. male.    Chief Complaint: Rectal Bleeding    HPI  44 yo M here for evaluation of rectal bleeding.  He's states that about 40 hours ago he began to experience severe "gas pain" and sat down to have BM - got diaphoretic, light-headed, pale, nauseated - he had a BM that was not loose or overly firm and noticed BRB on toilet tissue when he wiped.  He continues to have BRB when wiping after BM's, states he is still having "waves" of the abdominal pain that seem to be improving.  Blood is not mixed with stool, no anal pain with BM's, no F/C.    No hx of chronic constipation/straining.  No unexplained weight loss, anorexia, recent change in bowel habits, or other constitutional symptoms.     Last colonoscopy - never  No family hx of CRC or IBD.      Review of patient's allergies indicates:  No Known Allergies    Past Medical History:   Diagnosis Date    Seasonal allergies 2015       Past Surgical History:   Procedure Laterality Date    EPIDURAL STEROID INJECTION INTO CERVICAL SPINE N/A 8/7/2024    Procedure: C7-T1 YOVANY;  Surgeon: Vero Bernard DO;  Location: [facility];  Service: Pain Management;  Laterality: N/A;  20 mins no ac oral       Current Medications[1]    No family history on file.    Social History     Socioeconomic History    Marital status:    Tobacco Use    Smoking status: Never    Smokeless tobacco: Never   Substance and Sexual Activity    Alcohol use: Not Currently     Comment: + moderate use of alcohol.  Lives at home    Drug use: Never    Sexual activity: Not Currently     Partners: Female     Birth control/protection: Implant     Social Drivers of Health     Financial Resource Strain: Low Risk  (4/3/2025)    Overall Financial Resource Strain (CARDIA)     Difficulty of Paying Living Expenses: Not hard at all   Food Insecurity: No Food Insecurity (4/3/2025)    Hunger Vital Sign     Worried About Running Out of Food in the Last " Year: Never true     Ran Out of Food in the Last Year: Never true   Transportation Needs: No Transportation Needs (4/3/2025)    PRAPARE - Transportation     Lack of Transportation (Medical): No     Lack of Transportation (Non-Medical): No   Physical Activity: Sufficiently Active (4/3/2025)    Exercise Vital Sign     Days of Exercise per Week: 5 days     Minutes of Exercise per Session: 50 min   Stress: No Stress Concern Present (4/3/2025)    [employer] [place] [employer] - Occupational Stress Questionnaire     Feeling of Stress : Only a little   Housing Stability: Low Risk  (4/3/2025)    Housing Stability Vital Sign     Unable to Pay for Housing in the Last Year: No     Number of Times Moved in the Last Year: 0     Homeless in the Last Year: No       Review of Systems   Constitutional:  Negative for activity change, appetite change, chills, diaphoresis, fatigue, fever and unexpected weight change.   Gastrointestinal:  Positive for abdominal pain and anal bleeding. Negative for abdominal distention, blood in stool, constipation, diarrhea, nausea, rectal pain and vomiting.   All other systems reviewed and are negative.      Objective:      Physical Exam  Vitals reviewed. Exam conducted with a chaperone present.   Constitutional:       Appearance: He is well-developed.   HENT:      Head: Normocephalic and atraumatic.   Eyes:      Conjunctiva/sclera: Conjunctivae normal.   Pulmonary:      Effort: Pulmonary effort is normal. No respiratory distress.   Abdominal:      General: There is no distension.      Palpations: Abdomen is soft. There is no mass.      Tenderness: There is no abdominal tenderness. There is no guarding or rebound.   Genitourinary:     Comments: Perineum - normal perianal skin, no mass, no fissure, no external hemorrhoids  GERA - good tone, no mass  Anoscopy - Grade 1 internal hemorrhoids with moderate inflammation     Skin:     General: Skin is warm and dry.      Findings: No erythema.    Neurological:      Mental Status: He is alert and oriented to person, place, and time.           Lab Results   Component Value Date    WBC 5.86 10/04/2022    HGB 14.5 10/04/2022    HCT 43.4 10/04/2022    MCV 89 10/04/2022     10/04/2022     BMP  Lab Results   Component Value Date     10/04/2022    K 3.7 10/04/2022     10/04/2022    CO2 23 10/04/2022    BUN 16 10/04/2022    CREATININE 1.1 10/04/2022    CALCIUM 9.5 10/04/2022    ANIONGAP 11 10/04/2022    ESTGFRAFRICA >60.0 08/19/2021    EGFRNONAA >60.0 08/19/2021     CMP  Sodium   Date Value Ref Range Status   10/04/2022 138 136 - 145 mmol/L Final     Potassium   Date Value Ref Range Status   10/04/2022 3.7 3.5 - 5.1 mmol/L Final     Chloride   Date Value Ref Range Status   10/04/2022 104 95 - 110 mmol/L Final     CO2   Date Value Ref Range Status   10/04/2022 23 23 - 29 mmol/L Final     Glucose   Date Value Ref Range Status   10/04/2022 98 70 - 110 mg/dL Final     BUN   Date Value Ref Range Status   10/04/2022 16 6 - 20 mg/dL Final     Creatinine   Date Value Ref Range Status   10/04/2022 1.1 0.5 - 1.4 mg/dL Final     Calcium   Date Value Ref Range Status   10/04/2022 9.5 8.7 - 10.5 mg/dL Final     Total Protein   Date Value Ref Range Status   10/04/2022 7.6 6.0 - 8.4 g/dL Final     Albumin   Date Value Ref Range Status   10/04/2022 4.6 3.5 - 5.2 g/dL Final   10/04/2022 4.7 3.6 - 5.1 g/dL Final     Total Bilirubin   Date Value Ref Range Status   10/04/2022 0.4 0.1 - 1.0 mg/dL Final     Comment:     For infants and newborns, interpretation of results should be based  on gestational age, weight and in agreement with clinical  observations.    Premature Infant recommended reference ranges:  Up to 24 hours.............<8.0 mg/dL  Up to 48 hours............<12.0 mg/dL  3-5 days..................<15.0 mg/dL  6-29 days.................<15.0 mg/dL       Alkaline Phosphatase   Date Value Ref Range Status   10/04/2022 51 (L) 55 - 135 U/L Final     AST  "  Date Value Ref Range Status   10/04/2022 18 10 - 40 U/L Final     ALT   Date Value Ref Range Status   10/04/2022 24 10 - 44 U/L Final     Anion Gap   Date Value Ref Range Status   10/04/2022 11 8 - 16 mmol/L Final     eGFR if    Date Value Ref Range Status   08/19/2021 >60.0 >60 mL/min/1.73 m^2 Final     eGFR if non    Date Value Ref Range Status   08/19/2021 >60.0 >60 mL/min/1.73 m^2 Final     Comment:     Calculation used to obtain the estimated glomerular filtration  rate (eGFR) is the CKD-EPI equation.        No results found for: "CEA"  No results found for: "CRP"        Assessment:       1. Internal hemorrhoids    2. Rectal bleeding        Plan:   Suspect bleeding is hemorrhoidal  Discussed pathophysiology of hemorrhoidal disease.  Will start with conservative measures:   -Increased fiber intake (20-25 grams/day) and fluid intake (8-10 glasses water/day)   -Daily fiber supplement   -Soaks/sitz baths   -Avoid excessive trauma/straining if possible   -Avoid sitting on toilet for long periods   -Anusol supp 2.5% bid.   Given that he does have some associated abdominal complaints and is >44 yo without a prior colonoscopy, will schedule him for colonoscopy - if no other source of bleeding identified and hemorrhoids are still problematic, can perform EBL at the same setting.        Jesse Holder MD, FACS, FASCRS  , Department of Colon & Rectal Surgery     This note was created using voice recognition software, and may contain some unrecognized transcriptional errors.          [1]   Current Outpatient Medications   Medication Sig Dispense Refill    EScitalopram oxalate (LEXAPRO) 10 MG tablet       fluticasone propionate (FLONASE) 50 mcg/actuation nasal spray SHAKE LIQUID AND USE 2 SPRAYS(100 MCG) IN EACH NOSTRIL DAILY 16 g 2    gabapentin (NEURONTIN) 300 MG capsule Take 600 mg by mouth 3 (three) times daily.      buPROPion (WELLBUTRIN) 100 MG tablet       hydrocortisone " (ANUSOL-HC) 25 mg suppository Place 1 suppository (25 mg total) rectally 2 (two) times daily. for 10 days 20 suppository 0    ketoconazole (NIZORAL) 2 % cream Apply to affected areas of face BID prn scaling. (Patient not taking: Reported on 4/3/2025) 60 g 5    meloxicam (MOBIC) 15 MG tablet Take 1 tablet (15 mg total) by mouth once daily. 28 tablet 0    moxifloxacin (VIGAMOX) 0.5 % ophthalmic solution       naproxen (NAPROSYN) 500 MG tablet Take 1 tablet (500 mg total) by mouth 2 (two) times daily with meals. 60 tablet 1     No current facility-administered medications for this visit.

## 2025-04-11 ENCOUNTER — TELEPHONE (OUTPATIENT)
Dept: ENDOSCOPY | Facility: HOSPITAL | Age: 45
End: 2025-04-11
Payer: COMMERCIAL

## 2025-04-11 NOTE — TELEPHONE ENCOUNTER
Pt states that condition is resolved and he would like to cancel request for colonoscopy. Pt encouraged to discuss with his md. Request cancelled

## 2025-04-25 ENCOUNTER — PATIENT MESSAGE (OUTPATIENT)
Dept: INTERNAL MEDICINE | Facility: CLINIC | Age: 45
End: 2025-04-25

## 2025-04-25 ENCOUNTER — OFFICE VISIT (OUTPATIENT)
Dept: INTERNAL MEDICINE | Facility: CLINIC | Age: 45
End: 2025-04-25
Payer: COMMERCIAL

## 2025-04-25 ENCOUNTER — TELEPHONE (OUTPATIENT)
Dept: INTERNAL MEDICINE | Facility: CLINIC | Age: 45
End: 2025-04-25

## 2025-04-25 DIAGNOSIS — Z13.6 ENCOUNTER FOR SCREENING FOR CARDIOVASCULAR DISORDERS: ICD-10-CM

## 2025-04-25 DIAGNOSIS — E78.5 HYPERLIPIDEMIA, UNSPECIFIED HYPERLIPIDEMIA TYPE: Primary | ICD-10-CM

## 2025-04-25 RX ORDER — ROSUVASTATIN CALCIUM 40 MG/1
40 TABLET, COATED ORAL NIGHTLY
Qty: 90 TABLET | Refills: 3 | Status: SHIPPED | OUTPATIENT
Start: 2025-04-25 | End: 2026-04-25

## 2025-04-25 NOTE — PROGRESS NOTES
Assessment:       1. Hyperlipidemia, unspecified hyperlipidemia type  - Lipid Panel; Future  - Comprehensive Metabolic Panel; Future  - rosuvastatin (CRESTOR) 40 MG Tab; Take 1 tablet (40 mg total) by mouth every evening.  Dispense: 90 tablet; Refill: 3  - CT Cardiac Scoring; Future    2. Encounter for screening for cardiovascular disorders  - CT Cardiac Scoring; Future        Plan:       1/2. Check lipids and CMP in 3 months.  Start Crestor 40 mg.  Check coronary calcium score.    Deep Scribe:  IMPRESSION:  1. Assessed lipid panel, noting significant increase in total cholesterol and LDL over past 3 years.  2. Considered genetic factors and lifestyle modifications in cholesterol management.  3. Initiated statin therapy with Crestor 40 mg daily to lower LDL cholesterol, noting it is a well-tolerated medication with infrequent side effects.  4. Proposed coronary calcium score to evaluate calcified plaque buildup in heart and guide further management.  5. Discussed potential for additional interventions (stress test, cardiology referral) based on coronary calcium score results.    SUMMARY:   Continue dietary changes and exercise routine for overall health benefits   Initiate statin therapy with rosuvastatin 40 mg daily   Order coronary calcium score test   Order lipid panel, electrolytes, kidney and liver function tests in 3 months   Schedule follow-up labs   Follow-up in 3 months to review lab results and assess response to statin therapy    MIXED HYPERLIPIDEMIA:   Monitored the patient's cholesterol levels over time, noting an increase in total cholesterol from 213 three years ago to 297 in the most recent test, with LDL increasing from 147 to 215 and triglycerides from 181 to 197.   Evaluated current cholesterol panel showing total cholesterol of 297, triglycerides 197, , and VLDL 38.   Assessed the significant increase in cholesterol levels over the past 3 years and discussed the possibility of genetic  factors contributing to high cholesterol.   Emphasized LDL as the primary risk factor for arterial plaque formation and cardiovascular events.   Initiated statin therapy with rosuvastatin 40 mg daily to lower LDL cholesterol, noting it is a well-tolerated medication with infrequent side effects.   Explained the difference between expected muscle soreness from physical activity and unexplained muscle aches as a potential medication side effect.   Ordered lipid panel, electrolytes, kidney and liver function tests to be performed in 3 months.   Recommend a coronary calcium score test to assess plaque buildup in the heart.   Advised the patient to continue with dietary changes and exercise for overall health benefits.   Ordered a coronary calcium score test.   Instructed the patient to contact the office to schedule the coronary calcium score test.   Mr. Vargas to continue current exercise routine and dietary changes for overall health benefits.    PERSONAL HISTORY OF HERNIATED DISC:   Noted the patient's history of herniated disc 2 years ago, which is no longer bothering them.   Acknowledged that the patient underwent physical therapy at [facility] for the herniated disc and continues with exercises for neck and shoulders.    FOLLOW-UP:   Instructed the patient to follow up in 3 months to review lab results and assess response to statin therapy.   Advised the patient to contact the office to schedule follow-up labs.               This note was generated with the assistance of ambient listening technology. Verbal consent was obtained by the patient and accompanying visitor(s) for the recording of patient appointment to facilitate this note. I attest to having reviewed and edited the generated note for accuracy, though some syntax or spelling errors may persist. Please contact the author of this note for any clarification.       Subjective:       Patient ID: Ramakrishna Vargas is a 45 y.o. male.    Chief Complaint:  Hyperlipidemia    Hyperlipidemia  Pertinent negatives include no chest pain.       The patient location is:  [place]   The chief complaint leading to consultation is:  Discuss cholesterol  Total time spent with patient: 8 minutes  Visit type: Virtual visit with synchronous audio and video  Each patient to whom he or she provides medical services by telemedicine is: (1) informed of the relationship between the physician and patient and the respective role of any other health care provider with respect to management of the patient; and (2) notified that he or she may decline to receive medical services by telemedicine and may withdraw from such care at any time.    45-year-old male here for evaluation of high cholesterol.  Last cholesterol panel was 3 years ago with total cholesterol 213, triglycerides 181, HDL 29, .  Total 297, trig 197, . - Cholesterol panel done yesterday.    History of Present Illness    CHIEF COMPLAINT:  Mr. Vargas presents today for evaluation of high cholesterol    HYPERLIPIDEMIA:  He reports a gradual increase in total cholesterol over the years. Recent cholesterol panel from yesterday showed total cholesterol 297, triglycerides 197, , and VLDL 38. A test two years ago showed total cholesterol of 260, and three years ago showed total cholesterol 213, triglycerides 181, HDL 29, and .    EXERCISE:  He exercises every other day, including push-ups and exercises targeting the neck and shoulders. He also engages in woodworking which provides additional muscular activity.    MEDICAL HISTORY:  He has a history of herniated disc from two years ago which resolved with physical therapy at [facility]. He continues maintenance exercises and denies current symptoms.    LABS:  Recent labs showed normal kidney and liver function.         Review of Systems   Constitutional:  Negative for activity change and unexpected weight change.   HENT:  Negative for hearing loss, rhinorrhea and  trouble swallowing.    Eyes:  Negative for discharge and visual disturbance.   Respiratory:  Negative for chest tightness and wheezing.    Cardiovascular:  Negative for chest pain and palpitations.   Gastrointestinal:  Negative for blood in stool, constipation, diarrhea and vomiting.   Endocrine: Negative for polydipsia and polyuria.   Genitourinary:  Negative for difficulty urinating, hematuria and urgency.   Musculoskeletal:  Negative for arthralgias, joint swelling and neck pain.   Neurological:  Negative for weakness and headaches.   Psychiatric/Behavioral:  Negative for confusion and dysphoric mood.              Objective:      Physical Exam  Constitutional:       General: He is not in acute distress.     Appearance: He is well-developed. He is not diaphoretic.   HENT:      Head: Normocephalic and atraumatic.      Right Ear: External ear normal.      Left Ear: External ear normal.   Pulmonary:      Effort: Pulmonary effort is normal.   Musculoskeletal:      Cervical back: Normal range of motion.

## 2025-04-25 NOTE — TELEPHONE ENCOUNTER
LVM advising to call back to schedule imaging (CT) as soon as convenient and blood work (lipid and cmp) in 3 months

## 2025-04-26 ENCOUNTER — HOSPITAL ENCOUNTER (OUTPATIENT)
Dept: RADIOLOGY | Facility: OTHER | Age: 45
Discharge: HOME | End: 2025-04-26
Attending: INTERNAL MEDICINE
Payer: COMMERCIAL

## 2025-04-26 DIAGNOSIS — E78.5 HYPERLIPIDEMIA, UNSPECIFIED HYPERLIPIDEMIA TYPE: ICD-10-CM

## 2025-04-26 DIAGNOSIS — Z13.6 ENCOUNTER FOR SCREENING FOR CARDIOVASCULAR DISORDERS: ICD-10-CM

## 2025-04-26 PROCEDURE — 75571 CT HRT W/O DYE W/CA TEST: CPT | Mod: 26,,, | Performed by: RADIOLOGY

## 2025-04-26 PROCEDURE — 75571 CT HRT W/O DYE W/CA TEST: CPT | Mod: TC

## 2025-04-28 ENCOUNTER — RESULTS FOLLOW-UP (OUTPATIENT)
Dept: URGENT CARE | Facility: CLINIC | Age: 45
End: 2025-04-28

## 2025-04-28 NOTE — TELEPHONE ENCOUNTER
LOV with No, Primary Doctor , 4/25/2025  Pt attached a copy of recent labs. Per chart review, you ordered a CMP and FLP on 4/25/25

## 2025-04-28 NOTE — TELEPHONE ENCOUNTER
Crestor 40 mg is appropriate for the level of patient's LDL.  His LDL being above 190 is concerning and can contribute to heart disease and strokes.  His electrolytes, kidney/liver function are normal.    A1c indicates an increased risk of diabetes.  Regular exercise 5 days a week, 30 minutes a day will help as well as Mediterranean diet.    Mediterranean style diet:    Eat:  Olive oil, lean meats such as chicken and fish and only small servings of carbohydrates.   Olive oil and vinegar instead of low fat salad dressings.  Cook food in olive oil.  You can pan fitzpatrick or saute fish and vegetables instead of boiling or baking.  Unsalted nuts for snacks. Walnuts, cashews, almonds, pecans and pistachios ( not peanuts). Try almond butter or cashew butter on toast or crackers.  Brown bread. You can also dip bread in olive oil and eat it as a snack or appetizer  Seasonal or frozen vegetables and fruits.     Avoid:  Saturated fats and deep fried foods. Also stay away from large servings of starches, sweets, desserts and sugary drinks (both sodas and fruit juices)

## 2025-04-29 NOTE — TELEPHONE ENCOUNTER
Crestor 40 mg would be ideal given the level of his cholesterol.  If he is concerned about side effects, he can take over-the-counter coenzyme Q10 2 negate the risk of side effects.  I would encourage him to take the coenzyme Q10 prior to trying a lower dose of the statin.

## 2025-04-29 NOTE — TELEPHONE ENCOUNTER
Pt inquiring if he can start with Crestor 20mg instead?    Also, pt has concerns about the statin and his back problems.